# Patient Record
Sex: FEMALE | Race: OTHER | NOT HISPANIC OR LATINO
[De-identification: names, ages, dates, MRNs, and addresses within clinical notes are randomized per-mention and may not be internally consistent; named-entity substitution may affect disease eponyms.]

---

## 2017-07-28 ENCOUNTER — APPOINTMENT (OUTPATIENT)
Dept: CARDIOLOGY | Facility: CLINIC | Age: 57
End: 2017-07-28

## 2018-04-20 ENCOUNTER — RESULT REVIEW (OUTPATIENT)
Age: 58
End: 2018-04-20

## 2019-01-28 ENCOUNTER — APPOINTMENT (OUTPATIENT)
Dept: CARDIOLOGY | Facility: CLINIC | Age: 59
End: 2019-01-28
Payer: COMMERCIAL

## 2019-01-28 ENCOUNTER — NON-APPOINTMENT (OUTPATIENT)
Age: 59
End: 2019-01-28

## 2019-01-28 VITALS
WEIGHT: 135 LBS | OXYGEN SATURATION: 100 % | HEART RATE: 65 BPM | SYSTOLIC BLOOD PRESSURE: 128 MMHG | BODY MASS INDEX: 25.49 KG/M2 | DIASTOLIC BLOOD PRESSURE: 84 MMHG | HEIGHT: 61 IN

## 2019-01-28 PROCEDURE — 99215 OFFICE O/P EST HI 40 MIN: CPT

## 2019-01-28 PROCEDURE — 93000 ELECTROCARDIOGRAM COMPLETE: CPT

## 2019-01-28 RX ORDER — ERYTHROMYCIN 5 MG/G
5 OINTMENT OPHTHALMIC
Qty: 4 | Refills: 0 | Status: DISCONTINUED | COMMUNITY
Start: 2018-09-11

## 2019-01-28 RX ORDER — OMEPRAZOLE 40 MG/1
40 CAPSULE, DELAYED RELEASE ORAL
Refills: 5 | Status: ACTIVE | COMMUNITY
Start: 2019-01-28

## 2019-01-28 RX ORDER — NEOMYCIN SULFATE, POLYMYXIN B SULFATE AND DEXAMETHASONE 3.5; 10000; 1 MG/ML; [USP'U]/ML; MG/ML
3.5-10000-0.1 SUSPENSION OPHTHALMIC
Qty: 5 | Refills: 0 | Status: DISCONTINUED | COMMUNITY
Start: 2018-10-01

## 2019-01-28 RX ORDER — HYDROCORTISONE ACETATE, PRAMOXINE HCL 2.5; 1 G/100G; G/100G
2.5-1 CREAM TOPICAL
Qty: 28 | Refills: 0 | Status: DISCONTINUED | COMMUNITY
Start: 2018-12-13

## 2019-01-28 RX ORDER — LISINOPRIL AND HYDROCHLOROTHIAZIDE TABLETS 20; 12.5 MG/1; MG/1
20-12.5 TABLET ORAL
Qty: 90 | Refills: 0 | Status: DISCONTINUED | COMMUNITY
Start: 2018-09-12

## 2019-01-28 RX ORDER — AMOXICILLIN AND CLAVULANATE POTASSIUM 875; 125 MG/1; MG/1
875-125 TABLET, COATED ORAL
Qty: 20 | Refills: 0 | Status: DISCONTINUED | COMMUNITY
Start: 2018-09-14

## 2019-01-28 RX ORDER — OMEPRAZOLE 40 MG/1
40 CAPSULE, DELAYED RELEASE ORAL
Qty: 90 | Refills: 0 | Status: DISCONTINUED | COMMUNITY
Start: 2018-12-28

## 2019-01-29 ENCOUNTER — INPATIENT (INPATIENT)
Facility: HOSPITAL | Age: 59
LOS: 0 days | Discharge: HOME | DRG: 287 | End: 2019-01-29
Attending: INTERNAL MEDICINE | Admitting: INTERNAL MEDICINE
Payer: COMMERCIAL

## 2019-01-29 VITALS
WEIGHT: 134.04 LBS | SYSTOLIC BLOOD PRESSURE: 140 MMHG | HEIGHT: 61 IN | TEMPERATURE: 98 F | DIASTOLIC BLOOD PRESSURE: 81 MMHG | OXYGEN SATURATION: 98 % | HEART RATE: 65 BPM | RESPIRATION RATE: 17 BRPM

## 2019-01-29 VITALS
TEMPERATURE: 98 F | HEART RATE: 56 BPM | RESPIRATION RATE: 18 BRPM | DIASTOLIC BLOOD PRESSURE: 66 MMHG | WEIGHT: 134.04 LBS | OXYGEN SATURATION: 98 % | HEIGHT: 61 IN | SYSTOLIC BLOOD PRESSURE: 135 MMHG

## 2019-01-29 DIAGNOSIS — Z98.890 OTHER SPECIFIED POSTPROCEDURAL STATES: Chronic | ICD-10-CM

## 2019-01-29 DIAGNOSIS — I24.9 ACUTE ISCHEMIC HEART DISEASE, UNSPECIFIED: ICD-10-CM

## 2019-01-29 LAB
ALBUMIN SERPL ELPH-MCNC: 4.4 G/DL — SIGNIFICANT CHANGE UP (ref 3.3–5)
ALP SERPL-CCNC: 60 U/L — SIGNIFICANT CHANGE UP (ref 40–120)
ALT FLD-CCNC: 27 U/L — SIGNIFICANT CHANGE UP (ref 10–45)
ANION GAP SERPL CALC-SCNC: 13 MMOL/L — SIGNIFICANT CHANGE UP (ref 5–17)
APTT BLD: 29.3 SEC — SIGNIFICANT CHANGE UP (ref 27.5–36.3)
AST SERPL-CCNC: 22 U/L — SIGNIFICANT CHANGE UP (ref 10–40)
BILIRUB SERPL-MCNC: 0.5 MG/DL — SIGNIFICANT CHANGE UP (ref 0.2–1.2)
BUN SERPL-MCNC: 22 MG/DL — SIGNIFICANT CHANGE UP (ref 7–23)
CALCIUM SERPL-MCNC: 9.5 MG/DL — SIGNIFICANT CHANGE UP (ref 8.4–10.5)
CHLORIDE SERPL-SCNC: 102 MMOL/L — SIGNIFICANT CHANGE UP (ref 96–108)
CO2 SERPL-SCNC: 24 MMOL/L — SIGNIFICANT CHANGE UP (ref 22–31)
CREAT SERPL-MCNC: 0.85 MG/DL — SIGNIFICANT CHANGE UP (ref 0.5–1.3)
GLUCOSE BLDC GLUCOMTR-MCNC: 98 MG/DL — SIGNIFICANT CHANGE UP (ref 70–99)
GLUCOSE SERPL-MCNC: 95 MG/DL — SIGNIFICANT CHANGE UP (ref 70–99)
HCT VFR BLD CALC: 38.2 % — SIGNIFICANT CHANGE UP (ref 34.5–45)
HCT VFR BLD CALC: 39.9 % — SIGNIFICANT CHANGE UP (ref 34.5–45)
HGB BLD-MCNC: 13.3 G/DL — SIGNIFICANT CHANGE UP (ref 11.5–15.5)
HGB BLD-MCNC: 13.8 G/DL — SIGNIFICANT CHANGE UP (ref 11.5–15.5)
INR BLD: 1.02 RATIO — SIGNIFICANT CHANGE UP (ref 0.88–1.16)
MCHC RBC-ENTMCNC: 29.8 PG — SIGNIFICANT CHANGE UP (ref 27–34)
MCHC RBC-ENTMCNC: 30 PG — SIGNIFICANT CHANGE UP (ref 27–34)
MCHC RBC-ENTMCNC: 34.7 GM/DL — SIGNIFICANT CHANGE UP (ref 32–36)
MCHC RBC-ENTMCNC: 34.9 GM/DL — SIGNIFICANT CHANGE UP (ref 32–36)
MCV RBC AUTO: 85.8 FL — SIGNIFICANT CHANGE UP (ref 80–100)
MCV RBC AUTO: 86.1 FL — SIGNIFICANT CHANGE UP (ref 80–100)
PLATELET # BLD AUTO: 303 K/UL — SIGNIFICANT CHANGE UP (ref 150–400)
PLATELET # BLD AUTO: 310 K/UL — SIGNIFICANT CHANGE UP (ref 150–400)
POTASSIUM SERPL-MCNC: 4 MMOL/L — SIGNIFICANT CHANGE UP (ref 3.5–5.3)
POTASSIUM SERPL-SCNC: 4 MMOL/L — SIGNIFICANT CHANGE UP (ref 3.5–5.3)
PROT SERPL-MCNC: 7.4 G/DL — SIGNIFICANT CHANGE UP (ref 6–8.3)
PROTHROM AB SERPL-ACNC: 11.6 SEC — SIGNIFICANT CHANGE UP (ref 10–12.9)
RBC # BLD: 4.44 M/UL — SIGNIFICANT CHANGE UP (ref 3.8–5.2)
RBC # BLD: 4.65 M/UL — SIGNIFICANT CHANGE UP (ref 3.8–5.2)
RBC # FLD: 12.1 % — SIGNIFICANT CHANGE UP (ref 10.3–14.5)
RBC # FLD: 12.4 % — SIGNIFICANT CHANGE UP (ref 10.3–14.5)
SODIUM SERPL-SCNC: 139 MMOL/L — SIGNIFICANT CHANGE UP (ref 135–145)
TROPONIN T, HIGH SENSITIVITY RESULT: 7 NG/L — SIGNIFICANT CHANGE UP (ref 0–51)
WBC # BLD: 8.3 K/UL — SIGNIFICANT CHANGE UP (ref 3.8–10.5)
WBC # BLD: 8.7 K/UL — SIGNIFICANT CHANGE UP (ref 3.8–10.5)
WBC # FLD AUTO: 8.3 K/UL — SIGNIFICANT CHANGE UP (ref 3.8–10.5)
WBC # FLD AUTO: 8.7 K/UL — SIGNIFICANT CHANGE UP (ref 3.8–10.5)

## 2019-01-29 PROCEDURE — C1887: CPT

## 2019-01-29 PROCEDURE — 82962 GLUCOSE BLOOD TEST: CPT

## 2019-01-29 PROCEDURE — 93454 CORONARY ARTERY ANGIO S&I: CPT | Mod: 26,GC

## 2019-01-29 PROCEDURE — 71046 X-RAY EXAM CHEST 2 VIEWS: CPT | Mod: 26

## 2019-01-29 PROCEDURE — 80053 COMPREHEN METABOLIC PANEL: CPT

## 2019-01-29 PROCEDURE — 85610 PROTHROMBIN TIME: CPT

## 2019-01-29 PROCEDURE — 93454 CORONARY ARTERY ANGIO S&I: CPT

## 2019-01-29 PROCEDURE — 99285 EMERGENCY DEPT VISIT HI MDM: CPT | Mod: 25

## 2019-01-29 PROCEDURE — 85027 COMPLETE CBC AUTOMATED: CPT

## 2019-01-29 PROCEDURE — 84484 ASSAY OF TROPONIN QUANT: CPT

## 2019-01-29 PROCEDURE — 93010 ELECTROCARDIOGRAM REPORT: CPT

## 2019-01-29 PROCEDURE — 71046 X-RAY EXAM CHEST 2 VIEWS: CPT

## 2019-01-29 PROCEDURE — 76937 US GUIDE VASCULAR ACCESS: CPT | Mod: 26,GC

## 2019-01-29 PROCEDURE — 93005 ELECTROCARDIOGRAM TRACING: CPT

## 2019-01-29 PROCEDURE — C1760: CPT

## 2019-01-29 PROCEDURE — 76937 US GUIDE VASCULAR ACCESS: CPT

## 2019-01-29 PROCEDURE — C1769: CPT

## 2019-01-29 PROCEDURE — 85730 THROMBOPLASTIN TIME PARTIAL: CPT

## 2019-01-29 PROCEDURE — C1894: CPT

## 2019-01-29 RX ORDER — ASPIRIN/CALCIUM CARB/MAGNESIUM 324 MG
243 TABLET ORAL ONCE
Qty: 0 | Refills: 0 | Status: COMPLETED | OUTPATIENT
Start: 2019-01-29 | End: 2019-01-29

## 2019-01-29 RX ADMIN — Medication 243 MILLIGRAM(S): at 08:48

## 2019-01-29 NOTE — ED ADULT NURSE NOTE - CHPI ED NUR SYMPTOMS NEG
no dizziness/no back pain/no chills/no diaphoresis/no syncope/no vomiting/no fever/no congestion/no shortness of breath/no nausea

## 2019-01-29 NOTE — ED PROVIDER NOTE - PHYSICAL EXAMINATION
Gen: AAOx3, NAD   Head: NCAT  ENT: Airway patent, moist mucous membranes, nasal passageways clear, no pharyngeal erythema or exudates, uvula midline, no cervical lymphadenopathy  Cardiac: Normal rate, normal rhythm, no murmurs/rubs/gallops appreciated  Respiratory: Lungs CTA B/L  Gastrointestinal: +BS, Abdomen soft, nontender, nondistended, no rebound, no guarding, no organomegaly   MSK: No gross abnormalities, FROM of all four extremities, no edema  HEME: Extremities warm, pulses intact and symmetrical in all four extremities  Skin: No rashes, no lesions  Neuro: No gross neurologic deficits

## 2019-01-29 NOTE — PHYSICAL EXAM
[General Appearance - Well Developed] : well developed [Normal Appearance] : normal appearance [Well Groomed] : well groomed [General Appearance - Well Nourished] : well nourished [No Deformities] : no deformities [General Appearance - In No Acute Distress] : no acute distress [Normal Conjunctiva] : the conjunctiva exhibited no abnormalities [Eyelids - No Xanthelasma] : the eyelids demonstrated no xanthelasmas [Normal Oral Mucosa] : normal oral mucosa [No Oral Pallor] : no oral pallor [No Oral Cyanosis] : no oral cyanosis [Normal Jugular Venous A Waves Present] : normal jugular venous A waves present [Normal Jugular Venous V Waves Present] : normal jugular venous V waves present [No Jugular Venous Aguilar A Waves] : no jugular venous aguilar A waves [Respiration, Rhythm And Depth] : normal respiratory rhythm and effort [Exaggerated Use Of Accessory Muscles For Inspiration] : no accessory muscle use [Auscultation Breath Sounds / Voice Sounds] : lungs were clear to auscultation bilaterally [Heart Rate And Rhythm] : heart rate and rhythm were normal [Heart Sounds] : normal S1 and S2 [Murmurs] : no murmurs present [Abdomen Soft] : soft [Abdomen Tenderness] : non-tender [Abdomen Mass (___ Cm)] : no abdominal mass palpated [Abnormal Walk] : normal gait [Gait - Sufficient For Exercise Testing] : the gait was sufficient for exercise testing [Nail Clubbing] : no clubbing of the fingernails [Cyanosis, Localized] : no localized cyanosis [Petechial Hemorrhages (___cm)] : no petechial hemorrhages [Skin Color & Pigmentation] : normal skin color and pigmentation [] : no rash [No Venous Stasis] : no venous stasis [Skin Lesions] : no skin lesions [No Skin Ulcers] : no skin ulcer [No Xanthoma] : no  xanthoma was observed [Oriented To Time, Place, And Person] : oriented to person, place, and time [Affect] : the affect was normal [Mood] : the mood was normal [No Anxiety] : not feeling anxious

## 2019-01-29 NOTE — ED ADULT NURSE NOTE - NSIMPLEMENTINTERV_GEN_ALL_ED
Implemented All Universal Safety Interventions:  [place] to call system. Call bell, personal items and telephone within reach. Instruct patient to call for assistance. Room bathroom lighting operational. Non-slip footwear when patient is off stretcher. Physically safe environment: no spills, clutter or unnecessary equipment. Stretcher in lowest position, wheels locked, appropriate side rails in place.

## 2019-01-29 NOTE — ED PROVIDER NOTE - NS ED ROS FT
Gen: No fever, normal appetite  Eyes: No eye irritation or discharge  ENT: No ear pain, congestion, sore throat  Resp: No cough or trouble breathing  Cardiovascular: +chest pain, no palpitation  Gastroenteric: No nausea/vomiting, diarrhea, constipation  :  No change in urine output; no dysuria  MS: No joint or muscle pain  Skin: No rashes  Neuro: No headache; no abnormal movements  Remainder negative, except as per the HPI

## 2019-01-29 NOTE — H&P CARDIOLOGY - PMH
Coronary stent    Pure hypercholesterolemia    Coronary stent  to RCA 6/19/09  Hypertension    Anxiety disorder    CAD (coronary artery disease)    GERD (gastroesophageal reflux disease)

## 2019-01-29 NOTE — ED ADULT TRIAGE NOTE - CHIEF COMPLAINT QUOTE
midsternal chest pain radiating to the right jaw with SOB starting last night while laying down, intermittent. denies N/V, numbness or tingling. PMH 1 stent placement

## 2019-01-29 NOTE — ED ADULT NURSE NOTE - OBJECTIVE STATEMENT
58F comes to ER c/o chest pressure. She is a&ox3 in no acute distress. States she developed chest pressure and jaw pain this AM. Also notices more fatigue on exertion. States her pressure is similar to the pain she had prior to her cardiac stent in 2009. She denies dizziness/N/V/D/abdominal pain/back pain/fever/chills/cough. States pain does not radiate. Pain not made worse by exertion. On exam, clear lungs, no edema, soft abdomen, NSR on cardiac monitor. Family is at bedside. patient is on cardiac monitor. EKG performed. Will continue to monitor.

## 2019-01-29 NOTE — ED PROVIDER NOTE - ATTENDING CONTRIBUTION TO CARE
57 y/o female with the above documented history and HPI who on exam appears well and comfortable. VSs noted, neck supple, lungs CTA, cardiac sounds s/ audible m/r/g, abdomen soft, NT/ND, extremities s/ asymmetry, calf ttp or palpable cord, skin s/ rash or edema and neurologically intact. Full investigation initiated. EKG unremarkable. Labs and CXR pending. Having experienced the very same prior to her PCI c/ stent, I anticipate admission with nothing clinically evident to suggest any alternative or additional acute or emergent process. Will follow her remaining studies, d/w her Cardiologist and treat/dispo accordingly.

## 2019-01-29 NOTE — ED PROVIDER NOTE - PMH
Anxiety Disorder    CAD (Coronary Artery Disease)    Coronary Stent    Coronary Stent  to RCA 6/19/09  GERD (Gastroesophageal Reflux    Hypercholesterolemia    Hypertension

## 2019-01-29 NOTE — ED PROVIDER NOTE - OBJECTIVE STATEMENT
58F hx CAD s/p stent x 1 2009, presenting w/ pressure like 58F hx CAD s/p stent x 1 2009, presenting w/ pressure like chest pain with associated sob x few minutes 58F hx CAD s/p stent x 1 2009, presenting w/ pressure like chest pain with associated sob x few minutes, occurring at 7pm yesterday evening, at rest. She went to sleep but today awoke and has persistent sensation of chest heaviness, 58F hx CAD s/p stent x 1 2009, presenting w/ pressure like chest pain with associated sob x few minutes, occurring at 7pm yesterday evening, at rest. She went to sleep but today awoke and has persistent sensation of chest heaviness, worse on ambulating up stairs. Last echo/stress was normal last year. No recent fevers/chills/ sob at rest/ or LE edema. Did go to [facility] a week ago but states flight was less than 6 hours . No dizziness or palpitations     Cardioloigst Dr Lucero 58F hx CAD s/p stent x 1 2009, presenting w/ pressure like chest pain with associated sob x few minutes, occurring at 7pm yesterday evening, at rest. She went to sleep but today awoke and has persistent sensation of chest heaviness, worse on ambulating up stairs. Last echo/stress was normal 2016. No recent fevers/chills/ sob at rest/ or LE edema. Did go to [facility] a week ago but states flight was less than 6 hours . No dizziness or palpitations     Cardioloigst Dr Lucero

## 2019-01-29 NOTE — DISCUSSION/SUMMARY
[FreeTextEntry1] : HTN-better controlled\par \par CAD- new sx after lost to followup.  Unstable angina  plan cath\par \par Lipids- stable with LDL goal < 70\par \par Followup in 2 weeks\par \par Cont MEDS

## 2019-02-11 ENCOUNTER — APPOINTMENT (OUTPATIENT)
Dept: CARDIOLOGY | Facility: CLINIC | Age: 59
End: 2019-02-11
Payer: COMMERCIAL

## 2019-02-11 VITALS
DIASTOLIC BLOOD PRESSURE: 89 MMHG | OXYGEN SATURATION: 99 % | BODY MASS INDEX: 25.49 KG/M2 | HEART RATE: 75 BPM | SYSTOLIC BLOOD PRESSURE: 138 MMHG | HEIGHT: 61 IN | WEIGHT: 135 LBS

## 2019-02-11 DIAGNOSIS — E78.00 PURE HYPERCHOLESTEROLEMIA, UNSPECIFIED: ICD-10-CM

## 2019-02-11 PROCEDURE — 93000 ELECTROCARDIOGRAM COMPLETE: CPT

## 2019-02-11 PROCEDURE — 99214 OFFICE O/P EST MOD 30 MIN: CPT

## 2019-04-29 NOTE — DISCUSSION/SUMMARY
Pt is overdue for appt   [FreeTextEntry1] : HTN-better controlled\par \par CAD- cath with patent vessles.  Sx improved\par \par Lipids- stable with LDL goal < 70\par \par Followup in 6 mths\par \par Cont MEDS

## 2019-08-12 ENCOUNTER — APPOINTMENT (OUTPATIENT)
Dept: CARDIOLOGY | Facility: CLINIC | Age: 59
End: 2019-08-12

## 2019-09-25 ENCOUNTER — APPOINTMENT (OUTPATIENT)
Dept: OTOLARYNGOLOGY | Facility: CLINIC | Age: 59
End: 2019-09-25
Payer: COMMERCIAL

## 2019-09-25 VITALS
HEART RATE: 75 BPM | WEIGHT: 135 LBS | DIASTOLIC BLOOD PRESSURE: 88 MMHG | HEIGHT: 61 IN | SYSTOLIC BLOOD PRESSURE: 150 MMHG | BODY MASS INDEX: 25.49 KG/M2

## 2019-09-25 DIAGNOSIS — H04.209 UNSPECIFIED EPIPHORA, UNSPECIFIED SIDE: ICD-10-CM

## 2019-09-25 DIAGNOSIS — R06.83 SNORING: ICD-10-CM

## 2019-09-25 PROCEDURE — 99204 OFFICE O/P NEW MOD 45 MIN: CPT | Mod: 25

## 2019-09-25 PROCEDURE — 31575 DIAGNOSTIC LARYNGOSCOPY: CPT

## 2019-09-25 NOTE — REVIEW OF SYSTEMS
[Sneezing] : sneezing [Seasonal Allergies] : seasonal allergies [Post Nasal Drip] : post nasal drip [Ear Itch] : ear itch [Problem Snoring] : problem snoring [Sinus Pressure] : sinus pressure [Sinus Pain] : sinus pain [Snoring With Pauses] : snoring with pauses [Throat Clearing] : throat clearing [Throat Itching] : throat itching [Throat Dryness] : throat dryness [Eyes Itch] : itching of the eyes [Joint Pain] : joint pain [Heartburn] : heartburn [Anxiety] : anxiety [Negative] : Heme/Lymph [FreeTextEntry8] : muscle aches

## 2019-09-25 NOTE — PHYSICAL EXAM
[] : septum deviated to the left [Laryngoscopy Performed] : laryngoscopy was performed, see procedure section for findings [Midline] : trachea located in midline position [Normal] : no rashes [de-identified] : small area in right cheek [de-identified] : enlarged

## 2019-09-25 NOTE — PROCEDURE
[Flexible Endoscope] : examined with the flexible endoscope [Serial Number: ___] : Serial Number: [unfilled] [Image(s) Captured] : image(s) captured and filed [Obstruction] : acute airway obstruction evaluation [Complicated Symptoms] : complicated symptoms requiring more thorough examination than provided by mirror [Topical Lidocaine] : topical lidocaine [Oxymetazoline HCl] : oxymetazoline HCl [Normal] : normal vallecula [Lateral Wall ___ %] : the lateral wall was [unfilled]U% collapsed [Velopharynx ___ %] : the velopharynx was [unfilled]U% collapsed [de-identified] : Patient was placed in the examination chair in a sitting position. The nose was decongested with oxymetazoline nasal solution. The airway was anesthetized with 4% Xylocaine.  The back of the throat was anesthetized with Cetacaine. Direct flexible/rigid video endoscopy was performed. Findings revealed:\par Patient is severely deviated septum to the right and left nasal fossa completely obstructed fracture line on the right nasopharynx collapses with a positive neuro maneuver larynx and epiglottis subglottic space open and clear [de-identified] : BOLA

## 2019-09-25 NOTE — HISTORY OF PRESENT ILLNESS
[de-identified] : 58 year old female here for nasal and sinus congestion, post nasal drip, snoring and allergies.  States has had symptoms for years.  States 1-2 sinus infections in the past year, treated with antibiotics.  Denies recent cat scan of sinuses.  States using Flonase most days, with minimal relief.  States snoring at night, unable to tolerate CPAP.  Patient requires an up to date sleep study.  Patient has a BMI of 25.5.  Patient has tried a CPAP machine without relief, feels claustrophobic, states has a panic attack with it on because difficulty breathing.  Patient has tried using a dental appliance without relief. \par Patient comorbidities include hypertension.  \par \par

## 2019-09-25 NOTE — CONSULT LETTER
[Dear  ___] : Dear  [unfilled], [Consult Letter:] : I had the pleasure of evaluating your patient, [unfilled]. [Sincerely,] : Sincerely, [Consult Closing:] : Thank you very much for allowing me to participate in the care of this patient.  If you have any questions, please do not hesitate to contact me. [Please see my note below.] : Please see my note below. [FreeTextEntry3] : Connor Izaguirre MD, KOTA, FACS\par  Department Otolaryngology\par Director of [facility]\par Professor of Otolaryngology, \par [employer]\par

## 2019-09-25 NOTE — REASON FOR VISIT
[Initial Evaluation] : an initial evaluation for [Other: _____] : [unfilled] [FreeTextEntry2] : here for sinus congestion, post nasal drip, snoring and allergies

## 2019-10-11 ENCOUNTER — OUTPATIENT (OUTPATIENT)
Dept: OUTPATIENT SERVICES | Facility: HOSPITAL | Age: 59
LOS: 1 days | End: 2019-10-11
Payer: COMMERCIAL

## 2019-10-11 VITALS
OXYGEN SATURATION: 98 % | WEIGHT: 136.91 LBS | TEMPERATURE: 98 F | HEIGHT: 61 IN | RESPIRATION RATE: 14 BRPM | HEART RATE: 61 BPM | SYSTOLIC BLOOD PRESSURE: 110 MMHG | DIASTOLIC BLOOD PRESSURE: 70 MMHG

## 2019-10-11 DIAGNOSIS — Z98.890 OTHER SPECIFIED POSTPROCEDURAL STATES: Chronic | ICD-10-CM

## 2019-10-11 DIAGNOSIS — G47.33 OBSTRUCTIVE SLEEP APNEA (ADULT) (PEDIATRIC): ICD-10-CM

## 2019-10-11 DIAGNOSIS — Z95.5 PRESENCE OF CORONARY ANGIOPLASTY IMPLANT AND GRAFT: Chronic | ICD-10-CM

## 2019-10-11 LAB
ANION GAP SERPL CALC-SCNC: 12 MMO/L — SIGNIFICANT CHANGE UP (ref 7–14)
BUN SERPL-MCNC: 21 MG/DL — SIGNIFICANT CHANGE UP (ref 7–23)
CALCIUM SERPL-MCNC: 9.7 MG/DL — SIGNIFICANT CHANGE UP (ref 8.4–10.5)
CHLORIDE SERPL-SCNC: 101 MMOL/L — SIGNIFICANT CHANGE UP (ref 98–107)
CO2 SERPL-SCNC: 29 MMOL/L — SIGNIFICANT CHANGE UP (ref 22–31)
CREAT SERPL-MCNC: 0.78 MG/DL — SIGNIFICANT CHANGE UP (ref 0.5–1.3)
GLUCOSE SERPL-MCNC: 91 MG/DL — SIGNIFICANT CHANGE UP (ref 70–99)
HCT VFR BLD CALC: 44.4 % — SIGNIFICANT CHANGE UP (ref 34.5–45)
HGB BLD-MCNC: 14.1 G/DL — SIGNIFICANT CHANGE UP (ref 11.5–15.5)
MCHC RBC-ENTMCNC: 28.3 PG — SIGNIFICANT CHANGE UP (ref 27–34)
MCHC RBC-ENTMCNC: 31.8 % — LOW (ref 32–36)
MCV RBC AUTO: 89 FL — SIGNIFICANT CHANGE UP (ref 80–100)
NRBC # FLD: 0 K/UL — SIGNIFICANT CHANGE UP (ref 0–0)
PLATELET # BLD AUTO: 323 K/UL — SIGNIFICANT CHANGE UP (ref 150–400)
PMV BLD: 10 FL — SIGNIFICANT CHANGE UP (ref 7–13)
POTASSIUM SERPL-MCNC: 3.9 MMOL/L — SIGNIFICANT CHANGE UP (ref 3.5–5.3)
POTASSIUM SERPL-SCNC: 3.9 MMOL/L — SIGNIFICANT CHANGE UP (ref 3.5–5.3)
RBC # BLD: 4.99 M/UL — SIGNIFICANT CHANGE UP (ref 3.8–5.2)
RBC # FLD: 13 % — SIGNIFICANT CHANGE UP (ref 10.3–14.5)
SODIUM SERPL-SCNC: 142 MMOL/L — SIGNIFICANT CHANGE UP (ref 135–145)
WBC # BLD: 8.18 K/UL — SIGNIFICANT CHANGE UP (ref 3.8–10.5)
WBC # FLD AUTO: 8.18 K/UL — SIGNIFICANT CHANGE UP (ref 3.8–10.5)

## 2019-10-11 PROCEDURE — 93010 ELECTROCARDIOGRAM REPORT: CPT

## 2019-10-11 RX ORDER — CETIRIZINE HYDROCHLORIDE 10 MG/1
1 TABLET ORAL
Qty: 0 | Refills: 0 | DISCHARGE

## 2019-10-11 NOTE — H&P PST ADULT - NEGATIVE BREAST SYMPTOMS
no breast tenderness L/no nipple discharge R/no nipple discharge L/no breast tenderness R/no breast lump L/no breast lump R

## 2019-10-11 NOTE — H&P PST ADULT - NEGATIVE CARDIOVASCULAR SYMPTOMS
no dyspnea on exertion/no palpitations/no claudication/no chest pain/no orthopnea/no paroxysmal nocturnal dyspnea

## 2019-10-11 NOTE — H&P PST ADULT - HISTORY OF PRESENT ILLNESS
58 year old female presents with preop diagnosis of BOLA, deviated nasal septum, and hypertrophy of nasal turbinates scheduled for septoplasty, bilateral turbinectomy, drug induced sleep endoscopy on 10/29/19. Patient has past medical history of Bola, HTN, HLD, CAD s/p 1 stent in RCA 2009, recent angiogram 1/2019 did not indicate restenosis of stent. Patient states she was diagnosed with BOLA in 2018 but does not feel comfortable using CPAP, reports occasional daytime sleepiness.

## 2019-10-11 NOTE — H&P PST ADULT - NEGATIVE ENMT SYMPTOMS
no ear pain/no vertigo/no tinnitus/no nasal discharge/no sinus symptoms/no hearing difficulty/no gum bleeding/no dry mouth/no throat pain/no dysphagia

## 2019-10-11 NOTE — H&P PST ADULT - NSICDXFAMILYHX_GEN_ALL_CORE_FT
FAMILY HISTORY:  Mother  Still living? No  Family history of early CAD, Age at diagnosis: Age Unknown

## 2019-10-11 NOTE — H&P PST ADULT - RS GEN PE MLT RESP DETAILS PC
breath sounds equal/no rales/no rhonchi/airway patent/clear to auscultation bilaterally/respirations non-labored/good air movement

## 2019-10-11 NOTE — H&P PST ADULT - NSICDXPASTMEDICALHX_GEN_ALL_CORE_FT
PAST MEDICAL HISTORY:  Anxiety Disorder     CAD (Coronary Artery Disease)     Coronary Stent to RCA 6/19/09    Deviated septum     GERD (Gastroesophageal Reflux     Hypercholesterolemia     Hypertension     BOLA on CPAP refuses to use bipap

## 2019-10-11 NOTE — H&P PST ADULT - NSICDXPROBLEM_GEN_ALL_CORE_FT
Problem: BOLA, deviated nasal septum, and hypertrophy of nasal turbinates    Assessment and Plan: Patient scheduled for septoplasty, bilateral turbinectomy, drug induced sleep endoscopy on 10/29/19.    Patient provided with verbal and written presurgical instructions; verbalized understanding  with teach back.    Patient instructed to take her own omeprazole for GI prophylaxis; verbalized understanding.      OR booking notified of BOLA  OR booking notified of coronary stent     Echo, angiogram and Stress test in chart     Cardiac evaluation requested by surgeon and PST for aspirin plan, patient verbalized understanding, will make appointment    Problem: Hypertension  Assessment and Plan: Patient instructed to take atenolol on day of procedure, verbalized understanding.  Patient instructed to hold lisinopril- HCTZ on DOS, verbalized understanding

## 2019-10-11 NOTE — H&P PST ADULT - NSICDXPASTSURGICALHX_GEN_ALL_CORE_FT
PAST SURGICAL HISTORY:  S/P colonoscopy     S/P Lymph Node Biopsy R axilla x2 1980's    S/P primary angioplasty with coronary stent 2009    Tubal Ligation 1993

## 2019-10-21 ENCOUNTER — APPOINTMENT (OUTPATIENT)
Dept: CARDIOLOGY | Facility: CLINIC | Age: 59
End: 2019-10-21

## 2019-10-29 ENCOUNTER — RESULT REVIEW (OUTPATIENT)
Age: 59
End: 2019-10-29

## 2019-10-29 ENCOUNTER — APPOINTMENT (OUTPATIENT)
Dept: OTOLARYNGOLOGY | Facility: HOSPITAL | Age: 59
End: 2019-10-29

## 2019-10-29 ENCOUNTER — OUTPATIENT (OUTPATIENT)
Dept: OUTPATIENT SERVICES | Facility: HOSPITAL | Age: 59
LOS: 1 days | Discharge: HOME | End: 2019-10-29
Payer: COMMERCIAL

## 2019-10-29 VITALS
TEMPERATURE: 98 F | DIASTOLIC BLOOD PRESSURE: 74 MMHG | RESPIRATION RATE: 16 BRPM | HEIGHT: 61 IN | SYSTOLIC BLOOD PRESSURE: 152 MMHG | OXYGEN SATURATION: 100 % | WEIGHT: 134.92 LBS | HEART RATE: 56 BPM

## 2019-10-29 DIAGNOSIS — Z98.890 OTHER SPECIFIED POSTPROCEDURAL STATES: Chronic | ICD-10-CM

## 2019-10-29 DIAGNOSIS — Z95.5 PRESENCE OF CORONARY ANGIOPLASTY IMPLANT AND GRAFT: Chronic | ICD-10-CM

## 2019-10-29 DIAGNOSIS — G47.33 OBSTRUCTIVE SLEEP APNEA (ADULT) (PEDIATRIC): ICD-10-CM

## 2019-10-29 PROBLEM — J34.2 DEVIATED NASAL SEPTUM: Chronic | Status: ACTIVE | Noted: 2019-10-11

## 2019-10-29 PROCEDURE — 88304 TISSUE EXAM BY PATHOLOGIST: CPT | Mod: 26

## 2019-10-29 PROCEDURE — 31575 DIAGNOSTIC LARYNGOSCOPY: CPT | Mod: GC

## 2019-10-29 PROCEDURE — 30520 REPAIR OF NASAL SEPTUM: CPT | Mod: GC

## 2019-10-29 PROCEDURE — 30130 EXCISE INFERIOR TURBINATE: CPT | Mod: 50,GC

## 2019-10-29 PROCEDURE — 99213 OFFICE O/P EST LOW 20 MIN: CPT

## 2019-10-29 PROCEDURE — 88311 DECALCIFY TISSUE: CPT | Mod: 26

## 2019-10-29 RX ORDER — ONDANSETRON 8 MG/1
4 TABLET, FILM COATED ORAL ONCE
Refills: 0 | Status: DISCONTINUED | OUTPATIENT
Start: 2019-10-29 | End: 2019-11-16

## 2019-10-29 RX ORDER — OXYCODONE AND ACETAMINOPHEN 5; 325 MG/1; MG/1
2 TABLET ORAL EVERY 6 HOURS
Refills: 0 | Status: DISCONTINUED | OUTPATIENT
Start: 2019-10-29 | End: 2019-10-29

## 2019-10-29 RX ORDER — OXYCODONE HYDROCHLORIDE 5 MG/1
5 TABLET ORAL ONCE
Refills: 0 | Status: DISCONTINUED | OUTPATIENT
Start: 2019-10-29 | End: 2019-10-29

## 2019-10-29 RX ORDER — LISINOPRIL 2.5 MG/1
20 TABLET ORAL DAILY
Refills: 0 | Status: DISCONTINUED | OUTPATIENT
Start: 2019-10-29 | End: 2019-11-16

## 2019-10-29 RX ORDER — ATENOLOL 25 MG/1
50 TABLET ORAL DAILY
Refills: 0 | Status: DISCONTINUED | OUTPATIENT
Start: 2019-10-29 | End: 2019-11-16

## 2019-10-29 RX ORDER — HYDROCHLOROTHIAZIDE 25 MG
12.5 TABLET ORAL DAILY
Refills: 0 | Status: DISCONTINUED | OUTPATIENT
Start: 2019-10-29 | End: 2019-11-16

## 2019-10-29 RX ORDER — SODIUM CHLORIDE 9 MG/ML
1000 INJECTION, SOLUTION INTRAVENOUS
Refills: 0 | Status: DISCONTINUED | OUTPATIENT
Start: 2019-10-29 | End: 2019-11-16

## 2019-10-29 RX ORDER — HYDROMORPHONE HYDROCHLORIDE 2 MG/ML
0.3 INJECTION INTRAMUSCULAR; INTRAVENOUS; SUBCUTANEOUS
Refills: 0 | Status: DISCONTINUED | OUTPATIENT
Start: 2019-10-29 | End: 2019-10-29

## 2019-10-29 RX ORDER — PANTOPRAZOLE SODIUM 20 MG/1
40 TABLET, DELAYED RELEASE ORAL
Refills: 0 | Status: DISCONTINUED | OUTPATIENT
Start: 2019-10-29 | End: 2019-11-16

## 2019-10-29 RX ORDER — OXYCODONE AND ACETAMINOPHEN 5; 325 MG/1; MG/1
1 TABLET ORAL EVERY 4 HOURS
Refills: 0 | Status: DISCONTINUED | OUTPATIENT
Start: 2019-10-29 | End: 2019-10-30

## 2019-10-29 RX ADMIN — OXYCODONE AND ACETAMINOPHEN 1 TABLET(S): 5; 325 TABLET ORAL at 20:22

## 2019-10-29 RX ADMIN — HYDROMORPHONE HYDROCHLORIDE 0.3 MILLIGRAM(S): 2 INJECTION INTRAMUSCULAR; INTRAVENOUS; SUBCUTANEOUS at 16:32

## 2019-10-29 RX ADMIN — SODIUM CHLORIDE 30 MILLILITER(S): 9 INJECTION, SOLUTION INTRAVENOUS at 13:00

## 2019-10-29 RX ADMIN — HYDROMORPHONE HYDROCHLORIDE 0.3 MILLIGRAM(S): 2 INJECTION INTRAMUSCULAR; INTRAVENOUS; SUBCUTANEOUS at 16:18

## 2019-10-29 NOTE — ASU PATIENT PROFILE, ADULT - PMH
Anxiety Disorder    CAD (Coronary Artery Disease)    Coronary Stent  to RCA 6/19/09  Deviated septum    GERD (Gastroesophageal Reflux    Hypercholesterolemia    Hypertension    BOLA on CPAP  refuses to use bipap

## 2019-10-29 NOTE — ASU PATIENT PROFILE, ADULT - PSH
S/P colonoscopy    S/P Lymph Node Biopsy  R axilla x2 1980's  S/P primary angioplasty with coronary stent  2009  Tubal Ligation  1993

## 2019-10-29 NOTE — BRIEF OPERATIVE NOTE - OPERATION/FINDINGS
Drug Induced Sleep Endoscopy  Septoplasty  Turbinate reduction  Right middle nadege bullosa reduction

## 2019-10-30 ENCOUNTER — APPOINTMENT (OUTPATIENT)
Dept: OTOLARYNGOLOGY | Facility: CLINIC | Age: 59
End: 2019-10-30

## 2019-10-30 VITALS
DIASTOLIC BLOOD PRESSURE: 56 MMHG | RESPIRATION RATE: 18 BRPM | TEMPERATURE: 99 F | HEART RATE: 62 BPM | SYSTOLIC BLOOD PRESSURE: 102 MMHG | OXYGEN SATURATION: 97 %

## 2019-10-30 RX ORDER — CEPHALEXIN 500 MG
1 CAPSULE ORAL
Qty: 14 | Refills: 0
Start: 2019-10-30 | End: 2019-11-05

## 2019-10-30 RX ADMIN — PANTOPRAZOLE SODIUM 40 MILLIGRAM(S): 20 TABLET, DELAYED RELEASE ORAL at 06:43

## 2019-10-30 RX ADMIN — OXYCODONE AND ACETAMINOPHEN 1 TABLET(S): 5; 325 TABLET ORAL at 04:02

## 2019-10-30 RX ADMIN — ATENOLOL 50 MILLIGRAM(S): 25 TABLET ORAL at 06:43

## 2019-10-30 RX ADMIN — LISINOPRIL 20 MILLIGRAM(S): 2.5 TABLET ORAL at 06:43

## 2019-10-30 RX ADMIN — OXYCODONE AND ACETAMINOPHEN 1 TABLET(S): 5; 325 TABLET ORAL at 04:31

## 2019-10-30 RX ADMIN — Medication 12.5 MILLIGRAM(S): at 06:43

## 2019-10-30 RX ADMIN — SODIUM CHLORIDE 30 MILLILITER(S): 9 INJECTION, SOLUTION INTRAVENOUS at 00:01

## 2019-10-30 NOTE — ASU DISCHARGE PLAN (ADULT/PEDIATRIC) - CARE PROVIDER_API CALL
Connor Izaguirre; KOTA)  Otolaryngology  [address] [place] [address] [phone]  Phone: [phone]  Fax: [phone]  Follow Up Time:

## 2019-10-30 NOTE — PROGRESS NOTE ADULT - SUBJECTIVE AND OBJECTIVE BOX
Patient seen and examined. No acute events overnight. nasal packing removed this AM.     Vital Signs Last 24 Hrs  T(C): 36.9 (30 Oct 2019 04:00), Max: 36.9 (29 Oct 2019 12:45)  T(F): 98.5 (30 Oct 2019 04:00), Max: 98.5 (30 Oct 2019 00:00)  HR: 68 (30 Oct 2019 07:00) (56 - 81)  BP: 118/69 (30 Oct 2019 06:00) (100/51 - 152/74)  BP(mean): 84 (30 Oct 2019 02:00) (63 - 103)  RR: 16 (30 Oct 2019 07:00) (11 - 24)  SpO2: 97% (30 Oct 2019 07:00) (94% - 100%)  NAD, awake  Breathing comfortably on room air, no stridor, no stertor  Nose: bilateral merocels in place s/p removal, bilateral nares with blood staining and minimal sanginous secretions  OC/OP: tongue wnl, FOM soft/flat, OP clear  Neck soft, flat    A/P 59F s/p DISE, septo, turbs 10/29. Recovering well  - antibiotics  - pain control PRN  - regular diet  - home meds  - dispo: DC home today

## 2019-11-06 ENCOUNTER — APPOINTMENT (OUTPATIENT)
Dept: OTOLARYNGOLOGY | Facility: CLINIC | Age: 59
End: 2019-11-06
Payer: COMMERCIAL

## 2019-11-06 PROCEDURE — 99024 POSTOP FOLLOW-UP VISIT: CPT

## 2019-11-06 NOTE — REVIEW OF SYSTEMS
[As Noted in HPI] : as noted in HPI [Nasal Congestion] : no nasal congestion [Nose Bleeds] : no nose bleeds [Negative] : Heme/Lymph

## 2019-11-06 NOTE — HISTORY OF PRESENT ILLNESS
[de-identified] : Ms. Mendez presents one week post septoplasty and turbinectomy for splint removal.  She is doing well but reports tenderness on the tip of her nose.  Denies any bleeding, pain, or discomfort and reports that her breating is slightly improved.

## 2019-11-22 ENCOUNTER — APPOINTMENT (OUTPATIENT)
Dept: OTOLARYNGOLOGY | Facility: CLINIC | Age: 59
End: 2019-11-22
Payer: COMMERCIAL

## 2019-11-22 PROCEDURE — 99024 POSTOP FOLLOW-UP VISIT: CPT

## 2019-11-22 NOTE — PROCEDURE
[FreeTextEntry3] : Nose sprayed with Lidocaine and Afrin.  Cotton soaked with afrin and lidocaine inserted into nose and left in place for 5 mins. Nose suctioned and extensive crusts debrided from  bilateral turbinates. Pt can breathe well . Pt tolerated procedure\par

## 2019-11-22 NOTE — HISTORY OF PRESENT ILLNESS
[de-identified] : 59 year female with progressive nasal congestion and  pain.  Pt is healing well. Pt admits to using saline  spray and gel diligently throughout the weeks. Pt has known anxiety disorder and wakes up consistently several times through the night feeling like she is suffocating.\par

## 2019-11-22 NOTE — REASON FOR VISIT
[Post-Operative Visit] : a post-operative visit [FreeTextEntry2] : nasal congestion [FreeTextEntry1] : s/p septoplasty and turbinectomy

## 2019-11-22 NOTE — PHYSICAL EXAM
[Normal] : no abnormal secretions [de-identified] : Septum is midline [de-identified] :  diffuse opacified crusting bilaterally lining the nasal cavity

## 2019-12-27 ENCOUNTER — RX RENEWAL (OUTPATIENT)
Age: 59
End: 2019-12-27

## 2020-07-01 ENCOUNTER — APPOINTMENT (OUTPATIENT)
Dept: OTOLARYNGOLOGY | Facility: CLINIC | Age: 60
End: 2020-07-01
Payer: COMMERCIAL

## 2020-07-01 VITALS
HEART RATE: 60 BPM | SYSTOLIC BLOOD PRESSURE: 126 MMHG | HEIGHT: 61 IN | WEIGHT: 135 LBS | BODY MASS INDEX: 25.49 KG/M2 | DIASTOLIC BLOOD PRESSURE: 80 MMHG

## 2020-07-01 DIAGNOSIS — R53.83 OTHER FATIGUE: ICD-10-CM

## 2020-07-01 DIAGNOSIS — L29.9 PRURITUS, UNSPECIFIED: ICD-10-CM

## 2020-07-01 DIAGNOSIS — G47.33 OBSTRUCTIVE SLEEP APNEA (ADULT) (PEDIATRIC): ICD-10-CM

## 2020-07-01 PROCEDURE — 99214 OFFICE O/P EST MOD 30 MIN: CPT | Mod: 25

## 2020-07-01 PROCEDURE — 31231 NASAL ENDOSCOPY DX: CPT

## 2020-07-01 RX ORDER — LEVOCETIRIZINE DIHYDROCHLORIDE 5 MG/1
5 TABLET ORAL
Refills: 0 | Status: DISCONTINUED | COMMUNITY
End: 2020-07-01

## 2020-07-01 RX ORDER — FLUTICASONE PROPIONATE 50 MCG
SPRAY, SUSPENSION NASAL
Refills: 0 | Status: DISCONTINUED | COMMUNITY
End: 2020-07-01

## 2020-07-01 RX ORDER — FLUOCINOLONE ACETONIDE 0.11 MG/ML
0.01 OIL AURICULAR (OTIC) DAILY
Qty: 1 | Refills: 4 | Status: ACTIVE | COMMUNITY
Start: 2020-07-01 | End: 1900-01-01

## 2020-07-01 RX ORDER — MOMETASONE 50 UG/1
50 SPRAY, METERED NASAL DAILY
Qty: 1 | Refills: 5 | Status: ACTIVE | COMMUNITY
Start: 2020-07-01 | End: 1900-01-01

## 2020-07-01 NOTE — HISTORY OF PRESENT ILLNESS
[de-identified] : 59 year old female follow up s/p Drug-induced sleep endoscopy, septoplasty, turbinectomy 10/29/2019.  States as of last week has started feeling a nasal obstruction, left side is worse than the right.  States having a little post nasal drip, sometimes has a little runny nose.  States ears are itchy.  Denies otalgia, otorrhea, ear infections.

## 2020-07-01 NOTE — PHYSICAL EXAM
[Nasal Endoscopy Performed] : nasal endoscopy was performed, see procedure section for findings [] : septum deviated to the left [Midline] : trachea located in midline position [Laryngoscopy Performed] : laryngoscopy was performed, see procedure section for findings [Normal] : no rashes [de-identified] : small area in right cheek [de-identified] : dry [de-identified] : allergic

## 2020-07-01 NOTE — REASON FOR VISIT
[Subsequent Evaluation] : a subsequent evaluation for [Nasal Septum (Deviated)] : deviated nasal septum [FreeTextEntry2] : follow up s/p Drug-induced sleep endoscopy, septoplasty, turbinectomy 10/29/2019

## 2020-07-01 NOTE — PROCEDURE
[Image(s) Captured] : image(s) captured and filed [Video Captured] : video captured and filed [Topical Lidocaine] : topical lidocaine [Oxymetazoline HCl] : oxymetazoline HCl [Flexible Endoscope] : examined with the flexible endoscope [Serial Number: ___] : Serial Number: [unfilled] [FreeTextEntry6] : Pre-op indication(s): nasal congestion left greater than right\par Post-op indication(s): slight septal swelling on left. allergic turbinates\par Verbal consent obtained from patient.\par “Anterior rhinoscopy insufficient to account for symptoms” \par Details for procedure: \par Scope #: [id number]\par Type of scope:    flexible fiber optic telescope   X  Rigid glass telescope \par Anesthesia and/or vasoconstriction was achieved topically by using: \par 4% Lidocaine spray   0.05% Oxymetazoline     Other ______ \par The following anatomic sites were directly examined in a sequential fashion: \par The scope was introduced in the nasal passage between the middle and inferior turbinates to exam the inferior portion of the middle meatus and the fontanelle, as well as the maxillary ostia. Next, the scope was passed medially and posteriorly to the middle turbinates to examine the sphenoethmoid recess and the superior turbinate region. \par Upon visualization the finders are as follows: \par Nasal Septum:     Deviated to slight  left   but still wide open\par Bleeding site cauterized:    Anterior   left   right   Posterior   left   right \par Method:   Silver Nitrate   YAG Laser    Electrocautery ______ \par Right Side: \par * Mucosa: allergic\par * Mucous: Normal\par * Polyp: Normal\par * Inferior Turbinate: Normal\par * Middle Turbinate: Normal\par * Superior Turbinate: Normal\par * Inferior Meatus: Normal\par * Middle Meatus: Normal\par * Super Meatus: Normal\par * Sphenoethmoidal Recess: Normal\par Left Side: \par * Mucosa: allergic\par * Mucous: Normal\par * Polyp: Normal\par * Inferior Turbinate: Normal\par * Middle Turbinate: Normal\par * Superior Turbinate: Normal\par * Inferior Meatus: Normal\par * Middle Meatus: Normal\par * Super Meatus: Normal\par * Sphenoethmoidal Recess: Normal\par The patient tolerated the procedure well without any complications.\par \par \par

## 2020-10-02 ENCOUNTER — APPOINTMENT (OUTPATIENT)
Dept: OTOLARYNGOLOGY | Facility: CLINIC | Age: 60
End: 2020-10-02

## 2021-01-13 ENCOUNTER — APPOINTMENT (OUTPATIENT)
Dept: OTOLARYNGOLOGY | Facility: CLINIC | Age: 61
End: 2021-01-13
Payer: COMMERCIAL

## 2021-01-13 VITALS
SYSTOLIC BLOOD PRESSURE: 128 MMHG | HEART RATE: 63 BPM | HEIGHT: 61 IN | WEIGHT: 135 LBS | BODY MASS INDEX: 25.49 KG/M2 | DIASTOLIC BLOOD PRESSURE: 82 MMHG

## 2021-01-13 DIAGNOSIS — Z78.9 OTHER SPECIFIED HEALTH STATUS: ICD-10-CM

## 2021-01-13 DIAGNOSIS — R06.02 SHORTNESS OF BREATH: ICD-10-CM

## 2021-01-13 DIAGNOSIS — K21.9 GASTRO-ESOPHAGEAL REFLUX DISEASE W/OUT ESOPHAGITIS: ICD-10-CM

## 2021-01-13 DIAGNOSIS — J34.2 DEVIATED NASAL SEPTUM: ICD-10-CM

## 2021-01-13 DIAGNOSIS — J30.2 OTHER SEASONAL ALLERGIC RHINITIS: ICD-10-CM

## 2021-01-13 DIAGNOSIS — R09.82 POSTNASAL DRIP: ICD-10-CM

## 2021-01-13 DIAGNOSIS — R09.81 NASAL CONGESTION: ICD-10-CM

## 2021-01-13 DIAGNOSIS — Z82.49 FAMILY HISTORY OF ISCHEMIC HEART DISEASE AND OTHER DISEASES OF THE CIRCULATORY SYSTEM: ICD-10-CM

## 2021-01-13 DIAGNOSIS — J30.9 ALLERGIC RHINITIS, UNSPECIFIED: ICD-10-CM

## 2021-01-13 DIAGNOSIS — R45.7 STATE OF EMOTIONAL SHOCK AND STRESS, UNSPECIFIED: ICD-10-CM

## 2021-01-13 PROCEDURE — 99214 OFFICE O/P EST MOD 30 MIN: CPT | Mod: 25

## 2021-01-13 PROCEDURE — 31231 NASAL ENDOSCOPY DX: CPT

## 2021-01-13 PROCEDURE — 99072 ADDL SUPL MATRL&STAF TM PHE: CPT

## 2021-01-13 RX ORDER — MOMETASONE 50 UG/1
50 SPRAY, METERED NASAL DAILY
Qty: 1 | Refills: 5 | Status: DISCONTINUED | COMMUNITY
Start: 2020-07-01 | End: 2021-01-13

## 2021-01-13 RX ORDER — CETIRIZINE HYDROCHLORIDE 10 MG/1
10 TABLET, FILM COATED ORAL DAILY
Qty: 30 | Refills: 5 | Status: ACTIVE | COMMUNITY
Start: 2021-01-13 | End: 1900-01-01

## 2021-01-13 RX ORDER — MOMETASONE 50 UG/1
50 SPRAY, METERED NASAL DAILY
Qty: 1 | Refills: 5 | Status: ACTIVE | COMMUNITY
Start: 2021-01-13 | End: 1900-01-01

## 2021-01-13 RX ORDER — AZELASTINE HYDROCHLORIDE 137 UG/1
0.1 SPRAY, METERED NASAL TWICE DAILY
Qty: 3 | Refills: 3 | Status: DISCONTINUED | COMMUNITY
Start: 2019-09-25 | End: 2021-01-13

## 2021-01-13 RX ORDER — AZELASTINE HYDROCHLORIDE 137 UG/1
0.1 SPRAY, METERED NASAL TWICE DAILY
Qty: 1 | Refills: 6 | Status: ACTIVE | COMMUNITY
Start: 2021-01-13 | End: 1900-01-01

## 2021-01-13 NOTE — PROCEDURE
[Image(s) Captured] : image(s) captured and filed [Video Captured] : video captured and filed [Topical Lidocaine] : topical lidocaine [Oxymetazoline HCl] : oxymetazoline HCl [Flexible Endoscope] : examined with the flexible endoscope [Serial Number: ___] : Serial Number: [unfilled] [Recalcitrant Symptoms] : recalcitrant symptoms  [Anatomical Abnormality] : anatomical abnormality [Anterior rhinoscopy insufficient to account for symptoms] : anterior rhinoscopy insufficient to account for symptoms [Allergic] : allergic signs [Delfina] : delfina [FreeTextEntry6] : Pre-op indication(s): Nasal congestion\par Post-op indication(s): Septum straight, allergic rhinitis\par Verbal consent obtained from patient.\par “Anterior rhinoscopy insufficient to account for symptoms” \par Details for procedure: \par Scope #: 196\par Type of scope:    flexible fiber optic telescope    X Rigid glass telescope \par Anesthesia and/or vasoconstriction was achieved topically by using: \par 4% Lidocaine spray   0.05% Oxymetazoline     Other ______ \par The following anatomic sites were directly examined in a sequential fashion: \par The scope was introduced in the nasal passage between the middle and inferior turbinates to exam the inferior portion of the middle meatus and the fontanelle, as well as the maxillary ostia. Next, the scope was passed medially and posteriorly to the middle turbinates to examine the sphenoethmoid recess and the superior turbinate region. \par Upon visualization the finders are as follows: \par Nasal Septum:   Normal    \par Bleeding site cauterized:    Anterior   left   right   Posterior   left   right \par Method:   Silver Nitrate   YAG Laser    Electrocautery ______ \par Right Side: \par * Mucosa: Normal\par * Mucous: Normal\par * Polyp: Normal\par * Inferior Turbinate: boggy\par * Middle Turbinate: Normal\par * Superior Turbinate: Normal\par * Inferior Meatus: Normal\par * Middle Meatus: Normal\par * Super Meatus: Normal\par * Sphenoethmoidal Recess: Normal\par Left Side: \par * Mucosa: Normal\par * Mucous: Normal\par * Polyp: Normal\par * Inferior Turbinate: boggy\par * Middle Turbinate: Normal\par * Superior Turbinate: Normal\par * Inferior Meatus: Normal\par * Middle Meatus: Normal\par * Super Meatus: Normal\par * Sphenoethmoidal Recess: Normal\par The patient tolerated the procedure well without any complications.\par \par \par

## 2021-01-13 NOTE — REASON FOR VISIT
[Subsequent Evaluation] : a subsequent evaluation for [FreeTextEntry2] : follow up s/p septoplasty, turbinectomy 10/29/2019.

## 2021-01-13 NOTE — PHYSICAL EXAM
[Nasal Endoscopy Performed] : nasal endoscopy was performed, see procedure section for findings [] : septum deviated to the left [Midline] : trachea located in midline position [Laryngoscopy Performed] : laryngoscopy was performed, see procedure section for findings [Normal] : no rashes [de-identified] : small area in right cheek [de-identified] : dry [de-identified] : allergic

## 2021-01-13 NOTE — HISTORY OF PRESENT ILLNESS
[de-identified] : 60 year old female follow up s/p septoplasty, turbinectomy 10/29/2019.  States the left side of her nose is obstructed since prior to the surgery and also has clear nasal discharge.  States at last visit July 1, 2020, was prescribed Nasonex daily with no relief.  Denies sinus pain, sinus pressure, post nasal drip.  Denies sinus infections in the past year. 
Patient/Caregiver provided printed discharge information.

## 2021-01-18 ENCOUNTER — INPATIENT (INPATIENT)
Facility: HOSPITAL | Age: 61
LOS: 2 days | Discharge: HOME | DRG: 247 | End: 2021-01-21
Attending: INTERNAL MEDICINE | Admitting: INTERNAL MEDICINE
Payer: COMMERCIAL

## 2021-01-18 VITALS
OXYGEN SATURATION: 98 % | HEIGHT: 61 IN | HEART RATE: 64 BPM | RESPIRATION RATE: 18 BRPM | WEIGHT: 134.92 LBS | DIASTOLIC BLOOD PRESSURE: 72 MMHG | SYSTOLIC BLOOD PRESSURE: 158 MMHG | TEMPERATURE: 98 F

## 2021-01-18 DIAGNOSIS — Z98.890 OTHER SPECIFIED POSTPROCEDURAL STATES: Chronic | ICD-10-CM

## 2021-01-18 DIAGNOSIS — Z95.5 PRESENCE OF CORONARY ANGIOPLASTY IMPLANT AND GRAFT: Chronic | ICD-10-CM

## 2021-01-18 LAB
ALBUMIN SERPL ELPH-MCNC: 4.4 G/DL — SIGNIFICANT CHANGE UP (ref 3.3–5)
ALP SERPL-CCNC: 69 U/L — SIGNIFICANT CHANGE UP (ref 40–120)
ALT FLD-CCNC: 44 U/L — SIGNIFICANT CHANGE UP (ref 10–45)
ANION GAP SERPL CALC-SCNC: 9 MMOL/L — SIGNIFICANT CHANGE UP (ref 5–17)
APTT BLD: 27.7 SEC — SIGNIFICANT CHANGE UP (ref 27.5–35.5)
AST SERPL-CCNC: 35 U/L — SIGNIFICANT CHANGE UP (ref 10–40)
BASOPHILS # BLD AUTO: 0.11 K/UL — SIGNIFICANT CHANGE UP (ref 0–0.2)
BASOPHILS NFR BLD AUTO: 1.1 % — SIGNIFICANT CHANGE UP (ref 0–2)
BILIRUB SERPL-MCNC: 0.2 MG/DL — SIGNIFICANT CHANGE UP (ref 0.2–1.2)
BUN SERPL-MCNC: 17 MG/DL — SIGNIFICANT CHANGE UP (ref 7–23)
CALCIUM SERPL-MCNC: 9.3 MG/DL — SIGNIFICANT CHANGE UP (ref 8.4–10.5)
CHLORIDE SERPL-SCNC: 102 MMOL/L — SIGNIFICANT CHANGE UP (ref 96–108)
CO2 SERPL-SCNC: 29 MMOL/L — SIGNIFICANT CHANGE UP (ref 22–31)
CREAT SERPL-MCNC: 0.72 MG/DL — SIGNIFICANT CHANGE UP (ref 0.5–1.3)
EOSINOPHIL # BLD AUTO: 0.28 K/UL — SIGNIFICANT CHANGE UP (ref 0–0.5)
EOSINOPHIL NFR BLD AUTO: 2.8 % — SIGNIFICANT CHANGE UP (ref 0–6)
GLUCOSE SERPL-MCNC: 127 MG/DL — HIGH (ref 70–99)
HCT VFR BLD CALC: 42 % — SIGNIFICANT CHANGE UP (ref 34.5–45)
HGB BLD-MCNC: 13.4 G/DL — SIGNIFICANT CHANGE UP (ref 11.5–15.5)
IMM GRANULOCYTES NFR BLD AUTO: 0.4 % — SIGNIFICANT CHANGE UP (ref 0–1.5)
INR BLD: 0.95 RATIO — SIGNIFICANT CHANGE UP (ref 0.88–1.16)
LYMPHOCYTES # BLD AUTO: 3.37 K/UL — HIGH (ref 1–3.3)
LYMPHOCYTES # BLD AUTO: 33.9 % — SIGNIFICANT CHANGE UP (ref 13–44)
MCHC RBC-ENTMCNC: 28.6 PG — SIGNIFICANT CHANGE UP (ref 27–34)
MCHC RBC-ENTMCNC: 31.9 GM/DL — LOW (ref 32–36)
MCV RBC AUTO: 89.7 FL — SIGNIFICANT CHANGE UP (ref 80–100)
MONOCYTES # BLD AUTO: 0.71 K/UL — SIGNIFICANT CHANGE UP (ref 0–0.9)
MONOCYTES NFR BLD AUTO: 7.1 % — SIGNIFICANT CHANGE UP (ref 2–14)
NEUTROPHILS # BLD AUTO: 5.44 K/UL — SIGNIFICANT CHANGE UP (ref 1.8–7.4)
NEUTROPHILS NFR BLD AUTO: 54.7 % — SIGNIFICANT CHANGE UP (ref 43–77)
NRBC # BLD: 0 /100 WBCS — SIGNIFICANT CHANGE UP (ref 0–0)
NT-PROBNP SERPL-SCNC: 81 PG/ML — SIGNIFICANT CHANGE UP (ref 0–300)
PLATELET # BLD AUTO: 305 K/UL — SIGNIFICANT CHANGE UP (ref 150–400)
POTASSIUM SERPL-MCNC: 3.9 MMOL/L — SIGNIFICANT CHANGE UP (ref 3.5–5.3)
POTASSIUM SERPL-SCNC: 3.9 MMOL/L — SIGNIFICANT CHANGE UP (ref 3.5–5.3)
PROT SERPL-MCNC: 7.4 G/DL — SIGNIFICANT CHANGE UP (ref 6–8.3)
PROTHROM AB SERPL-ACNC: 11.4 SEC — SIGNIFICANT CHANGE UP (ref 10.6–13.6)
RBC # BLD: 4.68 M/UL — SIGNIFICANT CHANGE UP (ref 3.8–5.2)
RBC # FLD: 12.9 % — SIGNIFICANT CHANGE UP (ref 10.3–14.5)
SARS-COV-2 RNA SPEC QL NAA+PROBE: SIGNIFICANT CHANGE UP
SODIUM SERPL-SCNC: 140 MMOL/L — SIGNIFICANT CHANGE UP (ref 135–145)
TROPONIN T, HIGH SENSITIVITY RESULT: <6 NG/L — SIGNIFICANT CHANGE UP (ref 0–51)
WBC # BLD: 9.95 K/UL — SIGNIFICANT CHANGE UP (ref 3.8–10.5)
WBC # FLD AUTO: 9.95 K/UL — SIGNIFICANT CHANGE UP (ref 3.8–10.5)

## 2021-01-18 PROCEDURE — 99220: CPT

## 2021-01-18 RX ORDER — PANTOPRAZOLE SODIUM 20 MG/1
40 TABLET, DELAYED RELEASE ORAL
Refills: 0 | Status: DISCONTINUED | OUTPATIENT
Start: 2021-01-18 | End: 2021-01-21

## 2021-01-18 RX ORDER — ACETAMINOPHEN 500 MG
650 TABLET ORAL ONCE
Refills: 0 | Status: COMPLETED | OUTPATIENT
Start: 2021-01-18 | End: 2021-01-19

## 2021-01-18 RX ORDER — HYDROCHLOROTHIAZIDE 25 MG
12.5 TABLET ORAL DAILY
Refills: 0 | Status: DISCONTINUED | OUTPATIENT
Start: 2021-01-18 | End: 2021-01-21

## 2021-01-18 RX ORDER — SIMVASTATIN 20 MG/1
20 TABLET, FILM COATED ORAL AT BEDTIME
Refills: 0 | Status: DISCONTINUED | OUTPATIENT
Start: 2021-01-18 | End: 2021-01-20

## 2021-01-18 RX ORDER — ASPIRIN/CALCIUM CARB/MAGNESIUM 324 MG
243 TABLET ORAL ONCE
Refills: 0 | Status: COMPLETED | OUTPATIENT
Start: 2021-01-18 | End: 2021-01-18

## 2021-01-18 RX ORDER — LISINOPRIL 2.5 MG/1
20 TABLET ORAL DAILY
Refills: 0 | Status: DISCONTINUED | OUTPATIENT
Start: 2021-01-18 | End: 2021-01-21

## 2021-01-18 RX ORDER — ASPIRIN/CALCIUM CARB/MAGNESIUM 324 MG
81 TABLET ORAL DAILY
Refills: 0 | Status: DISCONTINUED | OUTPATIENT
Start: 2021-01-18 | End: 2021-01-20

## 2021-01-18 RX ADMIN — Medication 243 MILLIGRAM(S): at 18:53

## 2021-01-18 NOTE — ED CDU PROVIDER INITIAL DAY NOTE - DETAILS
60 y.o F with PMHx HTN, HLD, CAD s/p (2009) stent c/o chest pain  Plan : 60 y.o F with PMHx HTN, HLD, CAD s/p (2009) stent c/o chest pain  Plan : frequent reeval, vitals q 4hrs, telemetry monitoring, Nuclear stress.

## 2021-01-18 NOTE — ED PROVIDER NOTE - OBJECTIVE STATEMENT
60 y.o F with PMHx HTN, HLD, and PSHx s/p stent presents with x2-3 weeks of chest heaviness radiating to left shoulder that worsened today associated with being lightheaded that was only today.  No cough, no fevers, no chills.  Symptoms worse with exertion (SoB worse than the CP).  No belly pain nausea or vomiting.  Has been taking all of her medications  as prescribed.  No other complaints, rest makes the symptoms better.  Pt stating (this is what it felt likje last time I got a stent).

## 2021-01-18 NOTE — ED PROVIDER NOTE - RAPID ASSESSMENT
60 y.o F with PMHx HTN, HLD, and PSHx s/p stent presents with x2-3 weeks of chest heaviness radiating to left shoulder that worsened today. Notes pain associated with SOB, dizziness, and nausea. Reports SOB with exertion, improved at rest. States feels similar to when she needed her stent. Endorses 81mg of aspirin. +fatigue. Denies fever, chills, cough, or recent COVID exposure.     Scribe Statement: Tressa DECKER Tiffany, attest that this documentation has been prepared under the direction and in the presence of Jazlyn Booker) 60 y.o F with PMHx HTN, HLD, and PSHx s/p stent presents with x2-3 weeks of chest heaviness radiating to left shoulder that worsened today. Notes pain associated with SOB, dizziness, and nausea. Reports SOB with exertion, improved at rest. States feels similar to when she needed her stent. Endorses 81mg of aspirin. +fatigue. Denies fever, chills, cough, or recent COVID exposure.     Scribe Statement: I, Eva Bustillos, attest that this documentation has been prepared under the direction and in the presence of Jazlyn Booker (PA)    Jazlyn Booker PA-C: The scribe's documentation has been prepared under my direction and personally reviewed by me in its entirety. I confirm that the note above accurately reflects history obtained.   Patient was rapidly assessed via telemedicine encounter; a limited history was obtained. The patient will be seen and further examined and worked up in the main ED and their care will be completed by the main ED team. Receiving team will follow up on labs, analgesia, any clinical imaging, and perform reassessment and disposition of the patient as clinically indicated. All decisions regarding the progression of care will be made at their discretion.

## 2021-01-18 NOTE — ED CDU PROVIDER INITIAL DAY NOTE - MEDICAL DECISION MAKING DETAILS
Kimberly: 60 year old female with stents s/p 2-3 weeks worse on exertion. will get labs, cxr, ekg, cardiac enzymes, stress test. cardiac monitoring, cards following, reasess.

## 2021-01-18 NOTE — ED CDU PROVIDER INITIAL DAY NOTE - OBJECTIVE STATEMENT
60 y.o F with PMHx HTN, HLD, and PSHx s/p stent presents with x2-3 weeks of chest heaviness radiating to left shoulder that worsened today associated with being lightheaded that was only today.  No cough, no fevers, no chills.  Symptoms worse with exertion (SoB worse than the CP).  No belly pain nausea or vomiting.  Has been taking all of her medications  as prescribed.  No other complaints, rest makes the symptoms better.  Pt stating (this is what it felt likje last time I got a stent). 60 y.o F with PMHx HTN, HLD, CAD s/p (2009) stent presents with x2-3 weeks of chest heaviness radiating to left shoulder that worsened today associated with being lightheaded that was only today.  No cough, no fevers, no chills.  Symptoms worse with exertion (SoB worse than the CP).  No belly pain nausea or vomiting.  Has been taking all of her medications  as prescribed.  No other complaints, rest makes the symptoms better.  Pt stating (this is what it felt like last time I got a stent).  In ED, patient had ekg no signs of acute ischemia, troponin <6, chest x ray no signs of acute pathology. ED discussed w/ Cardiology and Pt sent to CDU for frequent reeval, vitals q 4hrs, telemetry monitoring w/ Nuclear stress. Cardiologist, Dr. Derrell Lucero

## 2021-01-18 NOTE — ED CDU PROVIDER INITIAL DAY NOTE - PROGRESS NOTE DETAILS
CDU PROGRESS NOTE NIA SUMMERS: Pt resting comfortably, NAD, VSS. No events on telemetry. Pt states chest pain has resolved, but reports having 6/10 headache. Will give Tylenol 650mg po and closely monitor.

## 2021-01-18 NOTE — ED ADULT TRIAGE NOTE - CHIEF COMPLAINT QUOTE
Chest pain and tightness x2 weeks, intermittent, radiating down the L arm, endorses OLGUIN. Takes ASA daily (81mg).  PMHx Cardiac stent, HTN, HLD.

## 2021-01-18 NOTE — ED ADULT NURSE NOTE - OBJECTIVE STATEMENT
60 year old female with a PMH CAD w/ stents, HTN and HLD comes to the ED c/o chest pain/dizziness/drowsiness for the last 2 weeks. Pt describes the pain as a pressure like pain across right and left chest, no radiation, nonexertional/non-positional. Pt states today, she became weak and tired a/w mild chest pain prompting her to come to the ED. Pt states her chief complaint is very similar to that when she had stents placed 10 years ago. Pt is a/ox4, VSS, sensory/motor function intact, speaking coherently, follows commands and in NAD at this time. Lung sounds are clear and equal b/l with no labored breathing noted. Abdomen is soft, nontender and nondistended. Peripheral pulses are strong and equal b/l with no edema noted. Skin is warm, dry and intact. Pt denies CP/SOB, f/c, n/v/d, dizziness/lightheadedness, numbness/tingling/weakness, abdominal pain/back pain, hematuria/dysuria/frequency at this time.

## 2021-01-18 NOTE — ED PROVIDER NOTE - CLINICAL SUMMARY MEDICAL DECISION MAKING FREE TEXT BOX
Deon: 60 year old female with cad, htn, hld, here with 2-3 weeks chest pain radiatin gto left shoulder. will get labs, cxr, ekg, cardiac enzymes, will speak with cards. similar pain in the past that required stent at that time.

## 2021-01-18 NOTE — ED ADULT NURSE NOTE - ED STAT RN HANDOFF DETAILS
Detail Level: Zone Detail Level: Simple Detail Level: Detailed Detail Level: Generalized report given to SUMMER Reid

## 2021-01-19 DIAGNOSIS — R07.9 CHEST PAIN, UNSPECIFIED: ICD-10-CM

## 2021-01-19 LAB
A1C WITH ESTIMATED AVERAGE GLUCOSE RESULT: 5.6 % — SIGNIFICANT CHANGE UP (ref 4–5.6)
CHOLEST SERPL-MCNC: 169 MG/DL — SIGNIFICANT CHANGE UP
ESTIMATED AVERAGE GLUCOSE: 114 MG/DL — SIGNIFICANT CHANGE UP (ref 68–114)
HDLC SERPL-MCNC: 45 MG/DL — LOW
LIPID PNL WITH DIRECT LDL SERPL: 92 MG/DL — SIGNIFICANT CHANGE UP
NON HDL CHOLESTEROL: 124 MG/DL — SIGNIFICANT CHANGE UP
TRIGL SERPL-MCNC: 160 MG/DL — HIGH
TROPONIN T, HIGH SENSITIVITY RESULT: <6 NG/L — SIGNIFICANT CHANGE UP (ref 0–51)

## 2021-01-19 PROCEDURE — 93018 CV STRESS TEST I&R ONLY: CPT

## 2021-01-19 PROCEDURE — 99217: CPT

## 2021-01-19 PROCEDURE — 99223 1ST HOSP IP/OBS HIGH 75: CPT

## 2021-01-19 PROCEDURE — 78452 HT MUSCLE IMAGE SPECT MULT: CPT | Mod: 26

## 2021-01-19 PROCEDURE — 93016 CV STRESS TEST SUPVJ ONLY: CPT

## 2021-01-19 RX ORDER — AZELASTINE 137 UG/1
2 SPRAY, METERED NASAL
Qty: 0 | Refills: 0 | DISCHARGE

## 2021-01-19 RX ORDER — OMEPRAZOLE 10 MG/1
1 CAPSULE, DELAYED RELEASE ORAL
Qty: 0 | Refills: 0 | DISCHARGE

## 2021-01-19 RX ORDER — LISINOPRIL/HYDROCHLOROTHIAZIDE 10-12.5 MG
1 TABLET ORAL
Qty: 0 | Refills: 0 | DISCHARGE

## 2021-01-19 RX ORDER — ENOXAPARIN SODIUM 100 MG/ML
40 INJECTION SUBCUTANEOUS DAILY
Refills: 0 | Status: DISCONTINUED | OUTPATIENT
Start: 2021-01-19 | End: 2021-01-21

## 2021-01-19 RX ORDER — ASPIRIN/CALCIUM CARB/MAGNESIUM 324 MG
1 TABLET ORAL
Qty: 0 | Refills: 0 | DISCHARGE

## 2021-01-19 RX ORDER — CHOLECALCIFEROL (VITAMIN D3) 125 MCG
1 CAPSULE ORAL
Qty: 0 | Refills: 0 | DISCHARGE

## 2021-01-19 RX ORDER — MOMETASONE FUROATE 50 UG/1
2 SPRAY NASAL
Qty: 0 | Refills: 0 | DISCHARGE

## 2021-01-19 RX ORDER — ATENOLOL 25 MG/1
50 TABLET ORAL DAILY
Refills: 0 | Status: DISCONTINUED | OUTPATIENT
Start: 2021-01-19 | End: 2021-01-21

## 2021-01-19 RX ORDER — ATENOLOL 25 MG/1
1 TABLET ORAL
Qty: 0 | Refills: 0 | DISCHARGE

## 2021-01-19 RX ORDER — LEVOCETIRIZINE DIHYDROCHLORIDE 0.5 MG/ML
1 SOLUTION ORAL
Qty: 0 | Refills: 0 | DISCHARGE

## 2021-01-19 RX ORDER — CETIRIZINE HYDROCHLORIDE 10 MG/1
1 TABLET ORAL
Qty: 0 | Refills: 0 | DISCHARGE

## 2021-01-19 RX ORDER — CHOLECALCIFEROL (VITAMIN D3) 125 MCG
1000 CAPSULE ORAL DAILY
Refills: 0 | Status: DISCONTINUED | OUTPATIENT
Start: 2021-01-19 | End: 2021-01-21

## 2021-01-19 RX ORDER — ACETAMINOPHEN 500 MG
650 TABLET ORAL ONCE
Refills: 0 | Status: COMPLETED | OUTPATIENT
Start: 2021-01-19 | End: 2021-01-19

## 2021-01-19 RX ORDER — ASPIRIN/CALCIUM CARB/MAGNESIUM 324 MG
81 TABLET ORAL DAILY
Refills: 0 | Status: DISCONTINUED | OUTPATIENT
Start: 2021-01-19 | End: 2021-01-21

## 2021-01-19 RX ADMIN — PANTOPRAZOLE SODIUM 40 MILLIGRAM(S): 20 TABLET, DELAYED RELEASE ORAL at 08:20

## 2021-01-19 RX ADMIN — Medication 650 MILLIGRAM(S): at 00:22

## 2021-01-19 RX ADMIN — Medication 81 MILLIGRAM(S): at 20:40

## 2021-01-19 RX ADMIN — Medication 10 MILLIGRAM(S): at 00:23

## 2021-01-19 RX ADMIN — SIMVASTATIN 20 MILLIGRAM(S): 20 TABLET, FILM COATED ORAL at 00:23

## 2021-01-19 RX ADMIN — SIMVASTATIN 20 MILLIGRAM(S): 20 TABLET, FILM COATED ORAL at 20:40

## 2021-01-19 RX ADMIN — Medication 650 MILLIGRAM(S): at 21:27

## 2021-01-19 RX ADMIN — Medication 10 MILLIGRAM(S): at 21:27

## 2021-01-19 NOTE — H&P ADULT - NSHPPHYSICALEXAM_GEN_ALL_CORE
T(F): 97.8 (01-19-21 @ 20:33), Max: 98.7 (01-19-21 @ 07:45)  HR: 56 (01-19-21 @ 20:33) (53 - 67)  BP: 127/79 (01-19-21 @ 20:33) (107/72 - 148/83)  RR: 17 (01-19-21 @ 20:33) (17 - 18)  SpO2: 98% (01-19-21 @ 20:33) (98% - 100%)    PHYSICAL EXAM:  GENERAL: NAD, well-developed  HEAD:  Atraumatic, Normocephalic  EYES: EOMI, PERRLA, conjunctiva and sclera clear  NECK: Supple, No JVD  CHEST/LUNG: Clear to auscultation bilaterally; No wheeze  HEART: Regular rate and rhythm; No murmurs, rubs, or gallops  ABDOMEN: Soft, Nontender, Nondistended; Bowel sounds present  EXTREMITIES:  2+ Peripheral Pulses, No clubbing, cyanosis, or edema  PSYCH: AAOx3  NEUROLOGY: non-focal  SKIN: No rashes or lesions

## 2021-01-19 NOTE — CONSULT NOTE ADULT - SUBJECTIVE AND OBJECTIVE BOX
Patient seen and evaluated at bedside    Chief Complaint: CP    HPI: Briefly, 61 y/o F w/ PMH of HTN, HTLD, CAD s/p PCI () c/o CP x 2W. Pt states that over last two weeks, she has noted intermittent CP. First ep occurred while walking. Pain is located substernally and radiates to her L shoulder. Pain described as moderate in intensity and self-resolved after several mins. Pt states that she has had mult recurrences of chest pain, typically while exerting herself, although occasionally at rest. She reports associated dyspnea, but no diaphoresis, nausea, nor vomiting. Of note, she notes associated fatigue; and this sensation is similar to prev ep that prompted stent. She had NST, which returned abnml.      PMHx:   Deviated septum    BOLA on CPAP    Coronary Stent    Hypercholesterolemia    Depression    Anxiety Disorder    Coronary Stent    CAD (Coronary Artery Disease)    GERD (Gastroesophageal Reflux    Hypertension    Hypertension        PSHx:   S/P primary angioplasty with coronary stent    S/P colonoscopy    S/P Lymph Node Biopsy    Tubal Ligation        Allergies:  No Known Allergies      Home Meds: Reviewed    Current Medications:   aspirin  chewable 81 milliGRAM(s) Oral daily  hydrochlorothiazide 12.5 milliGRAM(s) Oral daily  lisinopril 20 milliGRAM(s) Oral daily  pantoprazole    Tablet 40 milliGRAM(s) Oral before breakfast  PARoxetine 10 milliGRAM(s) Oral daily  simvastatin 20 milliGRAM(s) Oral at bedtime      FAMILY HISTORY:  Family history of early CAD (Mother)  Mom at ag 50  of MI        Social History:  Smoking History: Denies  Alcohol Use: Social  Drug Use: Denies    REVIEW OF SYSTEMS:  CONSTITUTIONAL: No weakness, fevers or chills  EYES/ENT: No visual changes;  No dysphagia  NECK: No pain or stiffness  RESPIRATORY: No cough, wheezing, hemoptysis; No shortness of breath  CARDIOVASCULAR: +CP  GASTROINTESTINAL: No abdominal or epigastric pain. No nausea, vomiting, or hematemesis; No diarrhea or constipation. No melena or hematochezia.  BACK: No back pain  GENITOURINARY: No dysuria, frequency or hematuria  NEUROLOGICAL: No numbness or weakness  SKIN: No itching, burning, rashes, or lesions   All other review of systems is negative unless indicated above.    Physical Exam:  T(F): 98.1 (-), Max: 98.7 ()  HR: 67 () (53 - 67)  BP: 136/75 () (107/72 - 148/83)  RR: 18 ()  SpO2: 98% ()  Gen: NAD.  HEENT: NCAT. PERRLA b/l.  Neck: No JVP elev.  CV: Normal S1, S2. RRR. No MRG.  Chest: CTAB. No WRR.  Abd: +BSx4. Soft. NTND.  Ext: No LE edema.  Skin: No cyanosis.    Cardiovascular Diagnostic Testing:    ECG: Personally reviewed: NSR    Stress Testing: < from: Nuclear Stress Test-Pharmacologic (Nuclear Stress Test-Pharmacologic .) (21 @ 12:55) >  IMPRESSIONS:Abnormal Study  * Chest Pain: No chest pain with administration of  Regadenoson.  *Symptom: Shortness of breath.  * HR Response: Appropriate.  * BP Response: Appropriate.  * Heart Rhythm: Sinus Bradycardia - 56 BPM.  * Baseline ECG: Nonspecific ST-T wave abnormality.  * ECG Changes: No significant ischemic ST segment changes  beyond baseline abnormalities.  * Arrhythmia: None.  * The left ventricle was small in size.  * There are medium-sized, mild defects in the distal  anterior, distal anteroseptal, and anteroapical walls that  are reversible suggestive of ischemia.  * There is a small, mild defect in the basal inferoseptal  wall that is reversible suggestive of ischemia.  * Post-stress gated wall motion analysis was performed  (LVEF > 70%;LVEDV = 44 ml.), revealing normal LV function.    Imaging:    CXR: Personally reviewed. Clear lungs.    Labs: Personally reviewed                        13.4   9.95  )-----------( 305      ( 2021 19:08 )             42.0         140  |  102  |  17  ----------------------------<  127<H>  3.9   |  29  |  0.72    Ca    9.3      2021 19:08    TPro  7.4  /  Alb  4.4  /  TBili  0.2  /  DBili  x   /  AST  35  /  ALT  44  /  AlkPhos  69      PT/INR - ( 2021 19:08 )   PT: 11.4 sec;   INR: 0.95 ratio         PTT - ( 2021 19:08 )  PTT:27.7 sec    CARDIAC MARKERS ( 2021 00:10 )  <6 ng/L / x     / x     / x     / x     / x      CARDIAC MARKERS ( 2021 19:08 )  <6 ng/L / x     / x     / 157 U/L / x     / 2.0 ng/mL        Serum Pro-Brain Natriuretic Peptide: 81 pg/mL ( @ 19:08)    Total Cholesterol: 169  LDL: --  HDL: 45  TG: 160

## 2021-01-19 NOTE — ED ADULT NURSE REASSESSMENT NOTE - URINE COLOR
Patient Education     Acute Otitis Media with Infection (Child)    Your child has a middle ear infection (acute otitis media). It is caused by bacteria or fungi. The middle ear is the space behind the eardrum. The eustachian tube connects the ear to the nasal passage. The eustachian tubes help drain fluid from the ears. They also keep the air pressure equal inside and outside the ears. These tubes are shorter and more horizontal in children. This makes it more likely for the tubes to become blocked. A blockage lets fluid and pressure build up in the middle ear. Bacteria or fungi can grow in this fluid and cause an ear infection. This infection is commonly known as an earache.  The main symptom of an ear infection is ear pain. Other symptoms may include pulling at the ear, being more fussy than usual, decreased appetite, and vomiting or diarrhea. Your child’s hearing may also be affected. Your child may have had a respiratory infection first.  An ear infection may clear up on its own. Or your child may need to take medicine. After the infection goes away, your child may still have fluid in the middle ear. It may take weeks or months for this fluid to go away. During that time, your child may have temporary hearing loss. But all other symptoms of the earache should be gone.  Home care  Follow these guidelines when caring for your child at home:  · The healthcare provider will likely prescribe medicines for pain. The provider may also prescribe antibiotics or antifungals to treat the infection. These may be liquid medicines to give by mouth. Or they may be ear drops. Follow the provider’s instructions for giving these medicines to your child.  · Because ear infections can clear up on their own, the provider may suggest waiting for a few days before giving your child medicines for infection.  · To reduce pain, have your child rest in an upright position. Hot or cold compresses held against the ear may help ease  pain.  · Keep the ear dry. Have your child wear a shower cap when bathing.  To help prevent future infections:  · Don't smoke near your child. Secondhand smoke raises the risk for ear infections in children.  · Make sure your child gets all appropriate vaccines.  · Do not bottle-feed while your baby is lying on his or her back. (This position can cause middle ear infections because it allows milk to run into the eustachian tubes.)      · If you breastfeed, continue until your child is 6 to 12 months of age.  To apply ear drops:  1. Put the bottle in warm water if the medicine is kept in the refrigerator. Cold drops in the ear are uncomfortable.  2. Have your child lie down on a flat surface. Gently hold your child’s head to 1 side.  3. Remove any drainage from the ear with a clean tissue or cotton swab. Clean only the outer ear. Don’t put the cotton swab into the ear canal.  4. Straighten the ear canal by gently pulling the earlobe up and back.  5. Keep the dropper a half-inch above the ear canal. This will keep the dropper from becoming contaminated. Put the drops against the side of the ear canal.  6. Have your child stay lying down for 2 to 3 minutes. This gives time for the medicine to enter the ear canal. If your child doesn’t have pain, gently massage the outer ear near the opening.  7. Wipe any extra medicine away from the outer ear with a clean cotton ball.  Follow-up care  Follow up with your child’s healthcare provider as directed. Your child will need to have the ear rechecked to make sure the infection has gone away. Check with the healthcare provider to see when they want to see your child.  Special note to parents  If your child continues to get earaches, he or she may need ear tubes. The provider will put small tubes in your child’s eardrum to help keep fluid from building up. This procedure is a simple and works well.  When to seek medical advice  Unless advised otherwise, call your child's  healthcare provider if:  · Your child is 3 months old or younger and has a fever of 100.4°F (38°C) or higher. Your child may need to see a healthcare provider.  · Your child is of any age and has fevers higher than 104°F (40°C) that come back again and again.  Call your child's healthcare provider for any of the following:  · New symptoms, especially swelling around the ear or weakness of face muscles  · Severe pain  · Infection seems to get worse, not better   · Neck pain  · Your child acts very sick or not himself or herself  · Fever or pain do not improve with antibiotics after 48 hours  Date Last Reviewed: 10/1/2017  © 3671-2308 [employer]. [address] [place] [address], [place] [address]. All rights reserved. This information is not intended as a substitute for professional medical care. Always follow your healthcare professional's instructions.            yellow

## 2021-01-19 NOTE — H&P ADULT - HISTORY OF PRESENT ILLNESS
60 y.o F with PMHx HTN, HLD, and PSHx s/p stent presents with x2-3 weeks of chest heaviness radiating to left shoulder that worsened today. Notes pain associated with SOB, dizziness, and nausea. Reports SOB with exertion, improved at rest. States feels similar to when she needed her stent in 2009. Endorses 81mg of aspirin. +fatigue. Denies fever, chills, cough, or recent COVID exposure.

## 2021-01-19 NOTE — ED ADULT NURSE REASSESSMENT NOTE - NS ED NURSE REASSESS COMMENT FT1
17.00 Has abnormal stress test  Pt is admitted for further workup awaiting bed has no chest pain
1853 IVL placed and meds given as ordered Jaya
Pt received from SUMMER Yi. Pt oriented to CDU & plan of care was discussed. Pt A&O x 4. Pt in CDU for frequent reeval, vitals q 4hrs, telemetry monitoring w/ Nuclear stress. Pt c/o 6/10 headache will medicate as per MAR. Pt denies any chest pain, SOB, dizziness or palpitations as of now. Pt on a cardiac monitor in sinus tony, HR in 50's. Pt aware no tot have any caffeine d/t stress in am. V/S stable, pt afebrile,  IV in place, patent and free of signs of infiltration. Pt resting in bed. Safety & comfort measures maintained. Call bell in reach. Will continue to monitor.
Pt received from SUMMER Wong. Pt oriented to CDU & plan of care was discussed. Pt denies any chest pain, SOB, dizziness or palpitations. Pt endorses 7/10 headache. Medicated as per MAR. Safety & comfort measures maintained. Call bell in reach. Will continue to monitor.
07.00 Am Received the Pt from  SUMMER Kemp  Pt is Observed for Chest pain for Nuclear stress test  Received the Pt A&OX 4 obeys commands Ira N/V/D fever chills cp SOB   Comfort care & safety measures continued  IV site looks clean & dry no signs of infiltration noted pt denies  pain IV site .  Pt is advised to call for help  call bell with in the reach pt verbalized the understanding .  pending CDU  MD prather . GCS 15/15 A&OX 4 PERRLA  size 3 Strong upper & lower extremities steady gait   No facial droop  No Hand Leg drop denies numbness tingling Continue to monitor  08.30 Pt went for nuclear stress test

## 2021-01-19 NOTE — ED CDU PROVIDER DISPOSITION NOTE - NSFOLLOWUPINSTRUCTIONS_ED_ALL_ED_FT
As per Cardiology..........  1. Follow up with your PMD within 48-72 hours.   You may schedule appointment with Cardiology clinic this week by calling [phone]  2. Show copies of your reports given to you. Take all of your other medications as previously prescribed.   3. Worsening or continued chest pain, shortness of breath, weakness, return to ED.

## 2021-01-19 NOTE — ED ADULT NURSE REASSESSMENT NOTE - GENERAL PATIENT STATE
comfortable appearance/cooperative
comfortable appearance/cooperative/smiling/interactive
comfortable appearance

## 2021-01-19 NOTE — ED CDU PROVIDER SUBSEQUENT DAY NOTE - HISTORY
CDU PROGRESS NOTE NIA SUMMERS: Pt resting comfortably, feeling well without complaint. NAD, No events on telemetry. Will continue to monitor, plan for Nuclear stress in am. Cardiology following.

## 2021-01-19 NOTE — CONSULT NOTE ADULT - ASSESSMENT
61 y/o F w/ PMH of HTN, HLD, CAD s/p PCI (2009) c/o CP x 2W.    #CP  -Concern for CAD as cause given description and known hx  -NST reviewed; abnml  -Trops neg x 2  -Given concern for UA, would load w/ ASA 325mg and clopidogrel 600mg  -C/w daily maintenance thereafter  -Medically optimize by incr atenolol to 100mg daily  -C/w home med simva 20, but would consider incr to atorva 40  -Given above, plan for [facility] w/ Dr. Derrell Lucero tomorrow    #Will d/w attg in AM    Rosendo Kramer MD  Cardiology Fellow  [phone]  All Cardiology service information can be found 24/7 on [email or web address], password: [email or web address] 59 y/o F w/ PMH of HTN, HLD, CAD s/p PCI (2009) c/o CP x 2W.    #CP  -Concern for CAD as cause given description and known hx  -NST reviewed; abnml  -Trops neg x 2  -Given concern for UA, would load w/ ASA 325mg and clopidogrel 600mg  -C/w daily maintenance thereafter  -Medically optimize by incr atenolol to 100mg daily  -dc home med simva 20, due to her hx of CAD increase to high intensity statin lipitor 40mg daily  refer to cardiac rehab  refer to nutrition    -Given above, plan for [facility] w/ Dr. Derrell Lucero tomorrow    #Will d/w attg in AM    Rosendo Kramer MD  Cardiology Fellow  [phone]  All Cardiology service information can be found 24/7 on [email or web address], password: [email or web address]    [facility] with PCI,   dc meds   ASA 81mg   plavix 75mg   lipitor 40mg    atenolol 50 mg oral tablet: 1 tab(s) orally once a day (19 Jan 2021 21:44)  azelastine 137 mcg/inh (0.1%) nasal spray: 2 spray(s) in each nostril 2 times a day (19 Jan 2021 21:44)  cetirizine 10 mg oral tablet: 1 tab(s) orally once a day (at bedtime) (19 Jan 2021 21:44)  lisinopril-hydrochlorothiazide 20 mg-12.5 mg oral tablet: 1 tab(s) orally once a day (19 Jan 2021 21:44)  mometasone 50 mcg/inh nasal spray: 2 spray(s) in each nostril once a day (19 Jan 2021 21:44)  omeprazole 40 mg oral delayed release capsule: 1 cap(s) orally once a day (19 Jan 2021 21:44)  Paxil 10 mg oral tablet: 1 tab(s) orally once a day (in the evening) (19 Jan 2021 21:44)  Vitamin D3 1000 intl units (25 mcg) oral tablet: 1 tab(s) orally once a day (19 Jan 2021 21:44)      Patient seen and examined with the fellow, the note above has been edited to reflect my independent history, physical exam, assessment and plan.      Yoni Mccord MD, PhD  Cardiology Attending  [facility]    For day time coverage Mon-Fri see Non-Service Consult Attending on [email or web address], password: [email or web address]; daytime weekends covered by general cardiology consult service attending.)

## 2021-01-19 NOTE — ED ADULT NURSE REASSESSMENT NOTE - NEURO SENSATION
Anesthesia Post Evaluation    Patient: Tee Aranda    Procedure(s) Performed: Procedure(s) (LRB):  EGD (ESOPHAGOGASTRODUODENOSCOPY) (N/A)    Final Anesthesia Type: general      Patient location during evaluation: ICU  Patient participation: Yes- Able to Participate  Level of consciousness: awake and alert and oriented  Post-procedure vital signs: reviewed and stable  Pain management: adequate  Airway patency: patent    PONV status at discharge: No PONV  Anesthetic complications: no      Cardiovascular status: blood pressure returned to baseline and hemodynamically stable  Respiratory status: unassisted  Hydration status: euvolemic  Follow-up not needed.          Vitals Value Taken Time   /64 12/28/20 1202   Temp  12/28/20 1247   Pulse 72 12/28/20 1247   Resp 20 12/28/20 1247   SpO2 99 % 12/28/20 1247   Vitals shown include unvalidated device data.      No case tracking events are documented in the log.      Pain/Dio Score: No data recorded      
sensory intact

## 2021-01-19 NOTE — ED CDU PROVIDER SUBSEQUENT DAY NOTE - MEDICAL DECISION MAKING DETAILS
Lamar Moore MD - Attending Physician: Pt here with chest pain, not present on exam this am. Initial ED eval neg. Awaiting Stress for dispo planning

## 2021-01-19 NOTE — ED CDU PROVIDER DISPOSITION NOTE - CLINICAL COURSE
60 y.o F with PMHx HTN, HLD, CAD s/p (2009) stent presents with x2-3 weeks of chest heaviness radiating to left shoulder that worsened today associated with being lightheaded that was only today.  No cough, no fevers, no chills.  Symptoms worse with exertion (SoB worse than the CP).  No belly pain nausea or vomiting.  Has been taking all of her medications  as prescribed.  No other complaints, rest makes the symptoms better.  Pt stating (this is what it felt like last time I got a stent).  In ED, patient had ekg no signs of acute ischemia, troponin x2 negative, Covid Negative, chest x ray no signs of acute pathology. ED discussed w/ Cardiology and Pt sent to CDU for frequent reeval, vitals q 4hrs, telemetry monitoring w/ Nuclear stress. Cardiologist, Dr. Derrell Lucero 60 y.o F with PMHx HTN, HLD, CAD s/p (2009) stent presents with x2-3 weeks of chest heaviness radiating to left shoulder that worsened today associated with being lightheaded that was only today.  No cough, no fevers, no chills.  Symptoms worse with exertion (SoB worse than the CP).  No belly pain nausea or vomiting.  Has been taking all of her medications  as prescribed.  No other complaints, rest makes the symptoms better.  Pt stating (this is what it felt like last time I got a stent).  In ED, patient had ekg no signs of acute ischemia, troponin x2 negative, Covid Negative, chest x ray no signs of acute pathology. ED discussed w/ Cardiology and Pt sent to CDU for frequent reeval, vitals q 4hrs, telemetry monitoring w/ Nuclear stress. Cardiologist, Dr. Derrell Lucero  In CDU Stress test abnormal: Medium-sized, mild defects in the distal anterior, distal anteroseptal, and anteroapical walls thatare reversible suggestive of ischemia. Small, mild defect in the basal inferoseptal wall that is reversible suggestive of ischemia. Normal LV function (LVEF > 70%;LVEDV = 44 ml.). Called the cardiology fellow, requesting call back as is currently in an emergency. Pt evaluated by cardiology fellow who d/w Dr. Lucero, requesting admission for cardiac catheterization. Pt ready and stable at time of admission.

## 2021-01-19 NOTE — ED ADULT NURSE REASSESSMENT NOTE - COMFORT CARE
darkened lights/plan of care explained
meal provided/plan of care explained/po fluids offered
ambulated to bathroom

## 2021-01-19 NOTE — ED CDU PROVIDER DISPOSITION NOTE - ATTENDING CONTRIBUTION TO CARE
CDU DISCHARGE NOTE - Dr. Moore Attending : I have personally seen and examined this patient. I have discussed the case with the ACP. I have reviewed all pertinent clinical information, including history, physical exam, plan and the ACP’s note and agree except as noted.  Patient requires admission given +Stress. HDS, exam benign

## 2021-01-19 NOTE — H&P ADULT - ASSESSMENT
60 y.o F with PMHx HTN, HLD, and PSHx s/p stent presents with x2-3 weeks of chest heaviness radiating to left shoulder that worsened today. Notes pain associated with SOB, dizziness, and nausea. Reports SOB with exertion, improved at rest. States feels similar to when she needed her stent in 2009. Endorses 81mg of aspirin. +fatigue. Denies fever, chills, cough, or recent COVID exposure.    Plan: cont outptn [facility] in am by dr arceo. dvt ppx w sc Lovenox. ptn presently asymptomatic

## 2021-01-19 NOTE — ED CDU PROVIDER SUBSEQUENT DAY NOTE - PROGRESS NOTE DETAILS
CDU PROGRESS NOTE PA MELINA: Pt resting comfortably, NAD, VSS. No events on telemetry. Pt seen at bedside. Pt in NAD, comfortable. VS stable from last reading. No events on tele. Pt has no complaints at this time. Denies CP and SOB. Pt aware of plan for today. Will continue to monitor. Pt back from stress. Pt in NAD, VSS, no events on tele. Pt at stress Pt in NAD, VSS, no events on tele.   Stress test result is abnormal: Medium-sized, mild defects in the distal anterior, distal anteroseptal, and anteroapical walls that are reversible suggestive of ischemia. Small, mild defect in the basal inferoseptal wall that is reversible suggestive of ischemia. Normal LV function (LVEF > 70%;LVEDV = 44 ml.).   I called the cardiology fellow, requesting call back as is currently in an emergency. Pt in NAD, VSS, no events on tele.   Stress test result is abnormal: Medium-sized, mild defects in the distal anterior, distal anteroseptal, and anteroapical walls that are reversible suggestive of ischemia. Small, mild defect in the basal inferoseptal wall that is reversible suggestive of ischemia. Normal LV function (LVEF > 70%;LVEDV = 44 ml.).   d/w cardiology fellow who will see pt. Pt evaluated by cardiology who requests admission for cardiac cath tomorrow. d/w academic hospitalist who states admission should got to Dr. Garcia. d/w Dr. Garcia who accepts admission. Pt in NAD, VSS, no events on tele. Plan d/w pt, all questions answered, pt ready and stable for admission.

## 2021-01-20 ENCOUNTER — TRANSCRIPTION ENCOUNTER (OUTPATIENT)
Age: 61
End: 2021-01-20

## 2021-01-20 LAB
HCV AB S/CO SERPL IA: 0.09 S/CO — SIGNIFICANT CHANGE UP (ref 0–0.99)
HCV AB SERPL-IMP: SIGNIFICANT CHANGE UP
SARS-COV-2 IGG SERPL QL IA: NEGATIVE — SIGNIFICANT CHANGE UP
SARS-COV-2 IGM SERPL IA-ACNC: <0.1 INDEX — SIGNIFICANT CHANGE UP

## 2021-01-20 PROCEDURE — 93458 L HRT ARTERY/VENTRICLE ANGIO: CPT | Mod: 26,59

## 2021-01-20 PROCEDURE — 93571 IV DOP VEL&/PRESS C FLO 1ST: CPT | Mod: 26,LD

## 2021-01-20 PROCEDURE — 92928 PRQ TCAT PLMT NTRAC ST 1 LES: CPT | Mod: LD

## 2021-01-20 PROCEDURE — 99152 MOD SED SAME PHYS/QHP 5/>YRS: CPT

## 2021-01-20 PROCEDURE — 93010 ELECTROCARDIOGRAM REPORT: CPT

## 2021-01-20 RX ORDER — ASPIRIN/CALCIUM CARB/MAGNESIUM 324 MG
1 TABLET ORAL
Qty: 30 | Refills: 0
Start: 2021-01-20 | End: 2021-02-18

## 2021-01-20 RX ORDER — ACETAMINOPHEN 500 MG
650 TABLET ORAL EVERY 6 HOURS
Refills: 0 | Status: DISCONTINUED | OUTPATIENT
Start: 2021-01-20 | End: 2021-01-21

## 2021-01-20 RX ORDER — ASPIRIN/CALCIUM CARB/MAGNESIUM 324 MG
1 TABLET ORAL
Qty: 0 | Refills: 0 | DISCHARGE

## 2021-01-20 RX ORDER — TICAGRELOR 90 MG/1
90 TABLET ORAL EVERY 12 HOURS
Refills: 0 | Status: DISCONTINUED | OUTPATIENT
Start: 2021-01-21 | End: 2021-01-21

## 2021-01-20 RX ORDER — TICAGRELOR 90 MG/1
1 TABLET ORAL
Qty: 180 | Refills: 3
Start: 2021-01-20 | End: 2022-01-14

## 2021-01-20 RX ORDER — TICAGRELOR 90 MG/1
90 TABLET ORAL EVERY 12 HOURS
Refills: 0 | Status: DISCONTINUED | OUTPATIENT
Start: 2021-01-20 | End: 2021-01-20

## 2021-01-20 RX ORDER — ATORVASTATIN CALCIUM 80 MG/1
80 TABLET, FILM COATED ORAL AT BEDTIME
Refills: 0 | Status: DISCONTINUED | OUTPATIENT
Start: 2021-01-20 | End: 2021-01-21

## 2021-01-20 RX ADMIN — ATENOLOL 50 MILLIGRAM(S): 25 TABLET ORAL at 05:37

## 2021-01-20 RX ADMIN — Medication 10 MILLIGRAM(S): at 21:39

## 2021-01-20 RX ADMIN — PANTOPRAZOLE SODIUM 40 MILLIGRAM(S): 20 TABLET, DELAYED RELEASE ORAL at 05:37

## 2021-01-20 RX ADMIN — ATORVASTATIN CALCIUM 80 MILLIGRAM(S): 80 TABLET, FILM COATED ORAL at 21:39

## 2021-01-20 RX ADMIN — Medication 1000 UNIT(S): at 11:39

## 2021-01-20 RX ADMIN — LISINOPRIL 20 MILLIGRAM(S): 2.5 TABLET ORAL at 05:37

## 2021-01-20 RX ADMIN — Medication 12.5 MILLIGRAM(S): at 05:36

## 2021-01-20 RX ADMIN — Medication 81 MILLIGRAM(S): at 05:37

## 2021-01-20 NOTE — CHART NOTE - NSCHARTNOTEFT_GEN_A_CORE
Removal of Femoral Sheath by NP Shiva    Pulses in the right lower extremity are palpable. The patient was placed in the supine position. The insertion site was identified and the sutures were removed per protocol.  The __5__ [place] femoral sheath was then removed. Direct pressure was applied for  __20____ minutes.     Monitoring of the right groin and both lower extremities including neuro-vascular checks and vital signs every 15 minutes x 4, then every 30 minutes x 2, then every 1 hour was ordered.    Complications: None/Other    Comments:

## 2021-01-21 VITALS
HEART RATE: 64 BPM | OXYGEN SATURATION: 98 % | SYSTOLIC BLOOD PRESSURE: 146 MMHG | DIASTOLIC BLOOD PRESSURE: 83 MMHG | RESPIRATION RATE: 16 BRPM | TEMPERATURE: 98 F

## 2021-01-21 PROCEDURE — 82553 CREATINE MB FRACTION: CPT

## 2021-01-21 PROCEDURE — C9600: CPT | Mod: LD

## 2021-01-21 PROCEDURE — 86769 SARS-COV-2 COVID-19 ANTIBODY: CPT

## 2021-01-21 PROCEDURE — 71045 X-RAY EXAM CHEST 1 VIEW: CPT

## 2021-01-21 PROCEDURE — 82550 ASSAY OF CK (CPK): CPT

## 2021-01-21 PROCEDURE — C1874: CPT

## 2021-01-21 PROCEDURE — 85025 COMPLETE CBC W/AUTO DIFF WBC: CPT

## 2021-01-21 PROCEDURE — 93571 IV DOP VEL&/PRESS C FLO 1ST: CPT | Mod: LD

## 2021-01-21 PROCEDURE — U0005: CPT

## 2021-01-21 PROCEDURE — A9500: CPT

## 2021-01-21 PROCEDURE — 80053 COMPREHEN METABOLIC PANEL: CPT

## 2021-01-21 PROCEDURE — 86803 HEPATITIS C AB TEST: CPT

## 2021-01-21 PROCEDURE — U0003: CPT

## 2021-01-21 PROCEDURE — 78452 HT MUSCLE IMAGE SPECT MULT: CPT

## 2021-01-21 PROCEDURE — 93005 ELECTROCARDIOGRAM TRACING: CPT

## 2021-01-21 PROCEDURE — 99233 SBSQ HOSP IP/OBS HIGH 50: CPT

## 2021-01-21 PROCEDURE — 80061 LIPID PANEL: CPT

## 2021-01-21 PROCEDURE — 99152 MOD SED SAME PHYS/QHP 5/>YRS: CPT

## 2021-01-21 PROCEDURE — 83036 HEMOGLOBIN GLYCOSYLATED A1C: CPT

## 2021-01-21 PROCEDURE — C1887: CPT

## 2021-01-21 PROCEDURE — 93017 CV STRESS TEST TRACING ONLY: CPT

## 2021-01-21 PROCEDURE — 85610 PROTHROMBIN TIME: CPT

## 2021-01-21 PROCEDURE — 93458 L HRT ARTERY/VENTRICLE ANGIO: CPT | Mod: 59

## 2021-01-21 PROCEDURE — C1769: CPT

## 2021-01-21 PROCEDURE — 84484 ASSAY OF TROPONIN QUANT: CPT

## 2021-01-21 PROCEDURE — 85730 THROMBOPLASTIN TIME PARTIAL: CPT

## 2021-01-21 PROCEDURE — G0378: CPT

## 2021-01-21 PROCEDURE — 83880 ASSAY OF NATRIURETIC PEPTIDE: CPT

## 2021-01-21 PROCEDURE — C1894: CPT

## 2021-01-21 PROCEDURE — 99153 MOD SED SAME PHYS/QHP EA: CPT

## 2021-01-21 PROCEDURE — 99285 EMERGENCY DEPT VISIT HI MDM: CPT | Mod: 25

## 2021-01-21 RX ORDER — TICAGRELOR 90 MG/1
1 TABLET ORAL
Qty: 60 | Refills: 0
Start: 2021-01-21 | End: 2021-02-19

## 2021-01-21 RX ORDER — TICAGRELOR 90 MG/1
1 TABLET ORAL
Qty: 180 | Refills: 3
Start: 2021-01-21 | End: 2022-01-15

## 2021-01-21 RX ORDER — ACETAMINOPHEN 500 MG
2 TABLET ORAL
Qty: 0 | Refills: 0 | DISCHARGE
Start: 2021-01-21

## 2021-01-21 RX ORDER — SIMVASTATIN 20 MG/1
1 TABLET, FILM COATED ORAL
Qty: 0 | Refills: 0 | DISCHARGE

## 2021-01-21 RX ORDER — ATORVASTATIN CALCIUM 80 MG/1
1 TABLET, FILM COATED ORAL
Qty: 30 | Refills: 0
Start: 2021-01-21 | End: 2021-02-19

## 2021-01-21 RX ADMIN — TICAGRELOR 90 MILLIGRAM(S): 90 TABLET ORAL at 06:35

## 2021-01-21 RX ADMIN — PANTOPRAZOLE SODIUM 40 MILLIGRAM(S): 20 TABLET, DELAYED RELEASE ORAL at 06:34

## 2021-01-21 RX ADMIN — Medication 81 MILLIGRAM(S): at 06:37

## 2021-01-21 RX ADMIN — Medication 12.5 MILLIGRAM(S): at 06:34

## 2021-01-21 RX ADMIN — LISINOPRIL 20 MILLIGRAM(S): 2.5 TABLET ORAL at 06:36

## 2021-01-21 NOTE — DISCHARGE NOTE PROVIDER - NSDCCPCAREPLAN_GEN_ALL_CORE_FT
PRINCIPAL DISCHARGE DIAGNOSIS  Diagnosis: CAD (coronary artery disease), native coronary artery  Assessment and Plan of Treatment: Do not stop you Aspirin or Brilinta unless instructed to do so by your cardiologist, they help keep your stented arteries open.   No heavy lifting, strenuous activity, bending, straining, or unnecessary stair climbing for 2 weeks. No driving for 2 days. You may shower 24 hours following the procedure but avoid baths/swimming for 1 week. Check your groin site for bleeding and/or swelling daily following procedure and call your doctor immediately if it occurs or if you experience increased pain at the site. Follow up with your cardiologist in 1-2 weeks. You may call [facility] if you have any questions/concerns regarding your procedure [phone].      SECONDARY DISCHARGE DIAGNOSES  Diagnosis: HTN (hypertension)  Assessment and Plan of Treatment: Continue with your blood pressure medications; eat a heart healthy diet with low salt diet; exercise regularly (consult with your physician or cardiologist first); maintain a heart healthy weight; if you smoke - quit (A resource to help you stop smoking is the [facility] sabio labs for Tobacco Control – phone number [phone].); include healthy ways to manage stress. Continue to follow with your primary care physician or cardiologist.    Diagnosis: HLD (hyperlipidemia)  Assessment and Plan of Treatment: Continue with your cholesterol medications. Eat a heart healthy diet that is low in saturated fats and salt, and includes whole grains, fruits, vegetables and lean protein; exercise regularly (consult with your physician or cardiologist first); maintain a heart healthy weight; if you smoke - quit (A resource to help you stop smoking is the [facility] sabio labs for Tobacco Control – phone number [phone].). Continue to follow with your primary physician or cardiologist.

## 2021-01-21 NOTE — DISCHARGE NOTE PROVIDER - NSDCCPTREATMENT_GEN_ALL_CORE_FT
PRINCIPAL PROCEDURE  Procedure: Placement of coronary artery stent  Findings and Treatment: 2 mid LAD stents

## 2021-01-21 NOTE — DISCHARGE NOTE PROVIDER - HOSPITAL COURSE
HPI:  60 y.o F with PMHx HTN, HLD, and PSHx s/p stent presents with x2-3 weeks of chest heaviness radiating to left shoulder that worsened today. Notes pain associated with SOB, dizziness, and nausea. Reports SOB with exertion, improved at rest. States feels similar to when she needed her stent in 2009. Endorses 81 mg of aspirin. +fatigue. Denies fever, chills, cough, or recent COVID exposure.  (19 Jan 2021 22:09).    1/20 cardiac cath with 2 stents to the mid LAD. Right groin site without swelling, bleeding.

## 2021-01-21 NOTE — PROGRESS NOTE ADULT - ASSESSMENT
60 y.o F with PMHx HTN, HLD, and PSHx s/p stent presents with x2-3 weeks of chest heaviness radiating to left shoulder that worsened today. Notes pain associated with SOB, dizziness, and nausea. Reports SOB with exertion, improved at rest. States feels similar to when she needed her stent in 2009. Endorses 81mg of aspirin. +fatigue. Denies fever, chills, cough, or recent COVID exposure.    Plan:   s/p PCI x 2 into LAD, spoke to cardiology: start Brilinta, was loaded in the cath lab, change statin to Lipitor 80 mg qhs, refer to cardiac rehab. follow up w cardiology. dc planning in am
60 y.o F with PMHx HTN, HLD, and PSHx s/p stent presents with x2-3 weeks of chest heaviness radiating to left shoulder that worsened today. Notes pain associated with SOB, dizziness, and nausea. Reports SOB with exertion, improved at rest. States feels similar to when she needed her stent in 2009. Endorses 81mg of aspirin. +fatigue. Denies fever, chills, cough, or recent COVID exposure.    Plan:   s/p PCI x 2 into LAD, spoke to cardiology: start Brilinta, was loaded in the cath lab, change statin to Lipitor 80 mg qhs, refer to cardiac rehab. follow up w cardiology. dc planning home today
61 y/o F w/ PMH of HTN, HLD, CAD s/p PCI (2009) c/o CP x 2W likely 2/2 UA now s/p PCI to LAD.    #CP  -S/p LHC/PCI to LAD  -C/w ASA/ticagrelor for at least 6M  -C/w atorva 80  -C/w atenolol 50  -Refer to cardiac rehab/nutrition    #No barriers to d/c from cards perspective. Pt should have close f/u w/ her usual cardiologist, Dr. Jazmine Kramer MD  Cardiology Fellow  [phone]  All Cardiology service information can be found 24/7 on [email or web address], password: [email or web address]

## 2021-01-21 NOTE — PROGRESS NOTE ADULT - SUBJECTIVE AND OBJECTIVE BOX
Patient seen and examined at bedside.    Overnight Events: NAEO. Pt s/p successful PCI to LAD yest. This AM, denies any complaints. R groin site CDI.    Review Of Systems: No chest pain, shortness of breath, or palpitations            Current Meds:  acetaminophen   Tablet .. 650 milliGRAM(s) Oral every 6 hours PRN  aspirin enteric coated 81 milliGRAM(s) Oral daily  ATENolol  Tablet 50 milliGRAM(s) Oral daily  atorvastatin 80 milliGRAM(s) Oral at bedtime  cholecalciferol 1000 Unit(s) Oral daily  enoxaparin Injectable 40 milliGRAM(s) SubCutaneous daily  hydrochlorothiazide 12.5 milliGRAM(s) Oral daily  lisinopril 20 milliGRAM(s) Oral daily  pantoprazole    Tablet 40 milliGRAM(s) Oral before breakfast  PARoxetine 10 milliGRAM(s) Oral daily  ticagrelor 90 milliGRAM(s) Oral every 12 hours      Vitals:  T(F): 98.1 (01-21), Max: 98.4 (01-20)  HR: 57 (01-21) (51 - 61)  BP: 109/70 (01-21) (100/68 - 148/86)  RR: 16 (01-21)  SpO2: 98% (01-21)  I&O's Summary    20 Jan 2021 07:01  -  21 Jan 2021 07:00  --------------------------------------------------------  IN: 120 mL / OUT: 0 mL / NET: 120 mL    21 Jan 2021 07:01  -  21 Jan 2021 08:09  --------------------------------------------------------  IN: 240 mL / OUT: 0 mL / NET: 240 mL        Physical Exam:  Gen: NAD.  HEENT: NCAT. PERRLA b/l.  Neck: No JVP elev.  CV: Normal S1, S2. RRR. No MRG.  Chest: CTAB. No WRR.  Abd: +BSx4. Soft. NTND.  Ext: No LE edema. R groin site CDI. RLE neurovasc intact.  Skin: No cyanosis.              CARDIAC MARKERS ( 19 Jan 2021 00:10 )  <6 ng/L / x     / x     / x     / x     / x      CARDIAC MARKERS ( 18 Jan 2021 19:08 )  <6 ng/L / x     / x     / 157 U/L / x     / 2.0 ng/mL      Serum Pro-Brain Natriuretic Peptide: 81 pg/mL (01-18 @ 19:08)    Cath: < from: Cardiac Cath Lab - Adult (01.20.21 @ 08:08) >  CORONARY VESSELS: The coronary circulation is right dominant.  LM:   --  LM: Normal.  LAD:   --  Mid LAD: There was a 70 % stenosis.  CX:   --  Circumflex: Normal.  RCA:   --  Mid RCA: Angiography showed mild atherosclerosis with no flow  limiting lesions.  COMPLICATIONS: There were no complications.  INTERVENTIONAL RECOMMENDATIONS: DAPT for 6 mths  
Patient is a 60y old  Female who presents with a chief complaint of UA (21 Jan 2021 08:09)      SUBJECTIVE / OVERNIGHT EVENTS: s/p PCI x 2 into LAD, spoke to cardiology: start Brilinta was loaded in the cath lab, change statin to Lipitor 80 mg qhs, refer to cardiac rehab    MEDICATIONS  (STANDING):  aspirin enteric coated 81 milliGRAM(s) Oral daily  ATENolol  Tablet 50 milliGRAM(s) Oral daily  atorvastatin 80 milliGRAM(s) Oral at bedtime  cholecalciferol 1000 Unit(s) Oral daily  enoxaparin Injectable 40 milliGRAM(s) SubCutaneous daily  hydrochlorothiazide 12.5 milliGRAM(s) Oral daily  lisinopril 20 milliGRAM(s) Oral daily  pantoprazole    Tablet 40 milliGRAM(s) Oral before breakfast  PARoxetine 10 milliGRAM(s) Oral daily  ticagrelor 90 milliGRAM(s) Oral every 12 hours    MEDICATIONS  (PRN):  acetaminophen   Tablet .. 650 milliGRAM(s) Oral every 6 hours PRN Mild Pain (1 - 3)      Vital Signs Last 24 Hrs  T(F): 98.2 (01-20-21 @ 16:51)  HR: 64 (01-21-20 @ 16:51)  BP: 146/83 (01-20-21 @ 16:51)   RR: 16 (01-20-21 @ 16:51)   SpO2: 98% (01-20-21 @ 16:51)   Telemetry:   CAPILLARY BLOOD GLUCOSE      PHYSICAL EXAM:  GENERAL: NAD, well-developed  HEAD:  Atraumatic, Normocephalic  EYES: EOMI, PERRLA, conjunctiva and sclera clear  NECK: Supple, No JVD  CHEST/LUNG: Clear to auscultation bilaterally; No wheeze  HEART: Regular rate and rhythm; No murmurs, rubs, or gallops  ABDOMEN: Soft, Nontender, Nondistended; Bowel sounds present  EXTREMITIES:  2+ Peripheral Pulses, No clubbing, cyanosis, or edema  PSYCH: AAOx3  NEUROLOGY: non-focal  SKIN: No rashes or lesions      
Patient is a 60y old  Female who presents with a chief complaint of UA (21 Jan 2021 08:09)      SUBJECTIVE / OVERNIGHT EVENTS: pain free, smiling, cleared by cardiology for DC home    MEDICATIONS  (STANDING):  aspirin enteric coated 81 milliGRAM(s) Oral daily  ATENolol  Tablet 50 milliGRAM(s) Oral daily  atorvastatin 80 milliGRAM(s) Oral at bedtime  cholecalciferol 1000 Unit(s) Oral daily  enoxaparin Injectable 40 milliGRAM(s) SubCutaneous daily  hydrochlorothiazide 12.5 milliGRAM(s) Oral daily  lisinopril 20 milliGRAM(s) Oral daily  pantoprazole    Tablet 40 milliGRAM(s) Oral before breakfast  PARoxetine 10 milliGRAM(s) Oral daily  ticagrelor 90 milliGRAM(s) Oral every 12 hours    MEDICATIONS  (PRN):  acetaminophen   Tablet .. 650 milliGRAM(s) Oral every 6 hours PRN Mild Pain (1 - 3)      Vital Signs Last 24 Hrs  T(F): 98.2 (01-21-21 @ 09:51), Max: 98.2 (01-21-21 @ 09:51)  HR: 62 (01-21-21 @ 09:51) (57 - 64)  BP: 136/83 (01-21-21 @ 09:51) (109/70 - 148/86)  RR: 16 (01-21-21 @ 09:51) (16 - 18)  SpO2: 98% (01-21-21 @ 09:51) (97% - 100%)  Telemetry:   CAPILLARY BLOOD GLUCOSE        I&O's Summary    20 Jan 2021 07:01  -  21 Jan 2021 07:00  --------------------------------------------------------  IN: 120 mL / OUT: 0 mL / NET: 120 mL    21 Jan 2021 07:01  -  21 Jan 2021 13:37  --------------------------------------------------------  IN: 480 mL / OUT: 0 mL / NET: 480 mL        PHYSICAL EXAM:  GENERAL: NAD, well-developed  HEAD:  Atraumatic, Normocephalic  EYES: EOMI, PERRLA, conjunctiva and sclera clear  NECK: Supple, No JVD  CHEST/LUNG: Clear to auscultation bilaterally; No wheeze  HEART: Regular rate and rhythm; No murmurs, rubs, or gallops  ABDOMEN: Soft, Nontender, Nondistended; Bowel sounds present  EXTREMITIES:  2+ Peripheral Pulses, No clubbing, cyanosis, or edema  PSYCH: AAOx3  NEUROLOGY: non-focal  SKIN: No rashes or lesions    LABS:                    RADIOLOGY & ADDITIONAL TESTS:    Imaging Personally Reviewed:    Consultant(s) Notes Reviewed:      Care Discussed with Consultants/Other Providers:

## 2021-01-21 NOTE — DISCHARGE NOTE PROVIDER - NSDCMRMEDTOKEN_GEN_ALL_CORE_FT
acetaminophen 325 mg oral tablet: 2 tab(s) orally every 6 hours, As needed, Mild Pain (1 - 3)  Artificial Tears ophthalmic solution: 1 drop(s) in each eye 2 times a day  aspirin 81 mg oral delayed release tablet: 1 tab(s) orally once a day  atenolol 50 mg oral tablet: 1 tab(s) orally once a day  atorvastatin 80 mg oral tablet: 1 tab(s) orally once a day (at bedtime)  azelastine 137 mcg/inh (0.1%) nasal spray: 2 spray(s) in each nostril 2 times a day  Brilinta (ticagrelor) 90 mg oral tablet: 1 tab(s) orally 2 times a day   cetirizine 10 mg oral tablet: 1 tab(s) orally once a day (at bedtime)  lisinopril-hydrochlorothiazide 20 mg-12.5 mg oral tablet: 1 tab(s) orally once a day  mometasone 50 mcg/inh nasal spray: 2 spray(s) in each nostril once a day  omeprazole 40 mg oral delayed release capsule: 1 cap(s) orally once a day  Paxil 10 mg oral tablet: 1 tab(s) orally once a day (in the evening)  Vitamin D3 1000 intl units (25 mcg) oral tablet: 1 tab(s) orally once a day   acetaminophen 325 mg oral tablet: 2 tab(s) orally every 6 hours, As needed, Mild Pain (1 - 3)  Artificial Tears ophthalmic solution: 1 drop(s) in each eye 2 times a day  aspirin 81 mg oral delayed release tablet: 1 tab(s) orally once a day  atenolol 50 mg oral tablet: 1 tab(s) orally once a day  atorvastatin 80 mg oral tablet: 1 tab(s) orally once a day (at bedtime)  azelastine 137 mcg/inh (0.1%) nasal spray: 2 spray(s) in each nostril 2 times a day  cetirizine 10 mg oral tablet: 1 tab(s) orally once a day (at bedtime)  lisinopril-hydrochlorothiazide 20 mg-12.5 mg oral tablet: 1 tab(s) orally once a day  mometasone 50 mcg/inh nasal spray: 2 spray(s) in each nostril once a day  omeprazole 40 mg oral delayed release capsule: 1 cap(s) orally once a day  Paxil 10 mg oral tablet: 1 tab(s) orally once a day (in the evening)  ticagrelor 90 mg oral tablet: 1 tab(s) orally every 12 hours  ticagrelor 90 mg oral tablet: 1 tab(s) orally every 12 hours  Vitamin D3 1000 intl units (25 mcg) oral tablet: 1 tab(s) orally once a day

## 2021-01-21 NOTE — DISCHARGE NOTE PROVIDER - CARE PROVIDER_API CALL
Garfield Torres  CARDIOLOGY  [address] [place] [address], [address] [place] [address]  Phone: [phone]  Fax: [phone]  Follow Up Time: 2 weeks

## 2021-01-21 NOTE — DISCHARGE NOTE PROVIDER - NSDCFUSCHEDAPPT_GEN_ALL_CORE_FT
ELIE CASILLAS ; 01/25/2021 ; NPP Cardio [address] ELIE aMdera ; 03/17/2021 ; NPP OtoLaryng [address] [place] [address]

## 2021-01-26 RX ORDER — ROSUVASTATIN CALCIUM 20 MG/1
20 TABLET, FILM COATED ORAL
Qty: 90 | Refills: 3 | Status: ACTIVE | COMMUNITY
Start: 1900-01-01 | End: 1900-01-01

## 2021-01-30 ENCOUNTER — EMERGENCY (EMERGENCY)
Facility: HOSPITAL | Age: 61
LOS: 1 days | Discharge: HOME | End: 2021-01-30
Attending: EMERGENCY MEDICINE
Payer: COMMERCIAL

## 2021-01-30 ENCOUNTER — TRANSCRIPTION ENCOUNTER (OUTPATIENT)
Age: 61
End: 2021-01-30

## 2021-01-30 VITALS
WEIGHT: 134.92 LBS | DIASTOLIC BLOOD PRESSURE: 57 MMHG | OXYGEN SATURATION: 95 % | SYSTOLIC BLOOD PRESSURE: 100 MMHG | HEIGHT: 61 IN | HEART RATE: 81 BPM | TEMPERATURE: 101 F | RESPIRATION RATE: 20 BRPM

## 2021-01-30 VITALS
HEART RATE: 76 BPM | OXYGEN SATURATION: 97 % | RESPIRATION RATE: 16 BRPM | SYSTOLIC BLOOD PRESSURE: 107 MMHG | DIASTOLIC BLOOD PRESSURE: 73 MMHG | TEMPERATURE: 99 F

## 2021-01-30 DIAGNOSIS — Z95.5 PRESENCE OF CORONARY ANGIOPLASTY IMPLANT AND GRAFT: Chronic | ICD-10-CM

## 2021-01-30 DIAGNOSIS — Z98.890 OTHER SPECIFIED POSTPROCEDURAL STATES: Chronic | ICD-10-CM

## 2021-01-30 LAB
ALBUMIN SERPL ELPH-MCNC: 3.9 G/DL — SIGNIFICANT CHANGE UP (ref 3.3–5)
ALP SERPL-CCNC: 51 U/L — SIGNIFICANT CHANGE UP (ref 40–120)
ALT FLD-CCNC: 34 U/L — SIGNIFICANT CHANGE UP (ref 10–45)
ANION GAP SERPL CALC-SCNC: 11 MMOL/L — SIGNIFICANT CHANGE UP (ref 5–17)
AST SERPL-CCNC: 41 U/L — HIGH (ref 10–40)
BASOPHILS # BLD AUTO: 0.03 K/UL — SIGNIFICANT CHANGE UP (ref 0–0.2)
BASOPHILS NFR BLD AUTO: 0.4 % — SIGNIFICANT CHANGE UP (ref 0–2)
BILIRUB SERPL-MCNC: 0.5 MG/DL — SIGNIFICANT CHANGE UP (ref 0.2–1.2)
BUN SERPL-MCNC: 23 MG/DL — SIGNIFICANT CHANGE UP (ref 7–23)
CALCIUM SERPL-MCNC: 9.2 MG/DL — SIGNIFICANT CHANGE UP (ref 8.4–10.5)
CHLORIDE SERPL-SCNC: 92 MMOL/L — LOW (ref 96–108)
CO2 SERPL-SCNC: 24 MMOL/L — SIGNIFICANT CHANGE UP (ref 22–31)
CREAT SERPL-MCNC: 1.01 MG/DL — SIGNIFICANT CHANGE UP (ref 0.5–1.3)
EOSINOPHIL # BLD AUTO: 0.01 K/UL — SIGNIFICANT CHANGE UP (ref 0–0.5)
EOSINOPHIL NFR BLD AUTO: 0.1 % — SIGNIFICANT CHANGE UP (ref 0–6)
GLUCOSE SERPL-MCNC: 115 MG/DL — HIGH (ref 70–99)
HCT VFR BLD CALC: 38.6 % — SIGNIFICANT CHANGE UP (ref 34.5–45)
HGB BLD-MCNC: 12.9 G/DL — SIGNIFICANT CHANGE UP (ref 11.5–15.5)
IMM GRANULOCYTES NFR BLD AUTO: 0.6 % — SIGNIFICANT CHANGE UP (ref 0–1.5)
LYMPHOCYTES # BLD AUTO: 1.67 K/UL — SIGNIFICANT CHANGE UP (ref 1–3.3)
LYMPHOCYTES # BLD AUTO: 24.2 % — SIGNIFICANT CHANGE UP (ref 13–44)
MCHC RBC-ENTMCNC: 28.6 PG — SIGNIFICANT CHANGE UP (ref 27–34)
MCHC RBC-ENTMCNC: 33.4 GM/DL — SIGNIFICANT CHANGE UP (ref 32–36)
MCV RBC AUTO: 85.6 FL — SIGNIFICANT CHANGE UP (ref 80–100)
MONOCYTES # BLD AUTO: 0.68 K/UL — SIGNIFICANT CHANGE UP (ref 0–0.9)
MONOCYTES NFR BLD AUTO: 9.8 % — SIGNIFICANT CHANGE UP (ref 2–14)
NEUTROPHILS # BLD AUTO: 4.48 K/UL — SIGNIFICANT CHANGE UP (ref 1.8–7.4)
NEUTROPHILS NFR BLD AUTO: 64.9 % — SIGNIFICANT CHANGE UP (ref 43–77)
NRBC # BLD: 0 /100 WBCS — SIGNIFICANT CHANGE UP (ref 0–0)
PLATELET # BLD AUTO: 248 K/UL — SIGNIFICANT CHANGE UP (ref 150–400)
POTASSIUM SERPL-MCNC: 4 MMOL/L — SIGNIFICANT CHANGE UP (ref 3.5–5.3)
POTASSIUM SERPL-SCNC: 4 MMOL/L — SIGNIFICANT CHANGE UP (ref 3.5–5.3)
PROT SERPL-MCNC: 6.9 G/DL — SIGNIFICANT CHANGE UP (ref 6–8.3)
RBC # BLD: 4.51 M/UL — SIGNIFICANT CHANGE UP (ref 3.8–5.2)
RBC # FLD: 12.7 % — SIGNIFICANT CHANGE UP (ref 10.3–14.5)
SARS-COV-2 RNA SPEC QL NAA+PROBE: SIGNIFICANT CHANGE UP
SODIUM SERPL-SCNC: 127 MMOL/L — LOW (ref 135–145)
WBC # BLD: 6.91 K/UL — SIGNIFICANT CHANGE UP (ref 3.8–10.5)
WBC # FLD AUTO: 6.91 K/UL — SIGNIFICANT CHANGE UP (ref 3.8–10.5)

## 2021-01-30 PROCEDURE — 71045 X-RAY EXAM CHEST 1 VIEW: CPT

## 2021-01-30 PROCEDURE — U0005: CPT

## 2021-01-30 PROCEDURE — 99284 EMERGENCY DEPT VISIT MOD MDM: CPT | Mod: 25

## 2021-01-30 PROCEDURE — 80053 COMPREHEN METABOLIC PANEL: CPT

## 2021-01-30 PROCEDURE — 85025 COMPLETE CBC W/AUTO DIFF WBC: CPT

## 2021-01-30 PROCEDURE — 71045 X-RAY EXAM CHEST 1 VIEW: CPT | Mod: 26

## 2021-01-30 PROCEDURE — U0003: CPT

## 2021-01-30 PROCEDURE — 99284 EMERGENCY DEPT VISIT MOD MDM: CPT

## 2021-01-30 RX ORDER — DIPHENHYDRAMINE HCL 50 MG
25 CAPSULE ORAL ONCE
Refills: 0 | Status: COMPLETED | OUTPATIENT
Start: 2021-01-30 | End: 2021-01-30

## 2021-01-30 RX ADMIN — Medication 25 MILLIGRAM(S): at 17:23

## 2021-01-30 NOTE — ED PROVIDER NOTE - PHYSICAL EXAMINATION
Gen: AAOx3, non-toxic  Head: NCAT  HEENT: EOMI, oral mucosa moist, normal conjunctiva  Lung: CTAB, no respiratory distress, no wheezes/rhonchi/rales B/L, speaking in full sentences  CV: RRR, no murmurs, rubs or gallops  Abd: soft, NTND, no guarding, no CVA tenderness  MSK: no visible deformities  Neuro: No focal sensory or motor deficits, normal CN exam   Skin: Warm, well perfused, no rash  Psych: normal affect.     Dino Cantu PGY3

## 2021-01-30 NOTE — ED ADULT NURSE NOTE - BEFAST SCREENING
Na left word for him to contact the office pb      ----- Message from Nirav Baldwin MD sent at 9/28/2020  1:35 PM CDT -----  See comments below and call patient to discuss.   Please close encounter when done -- no need to route back to me.  Thanks  Except for BS, all is OK       Negative

## 2021-01-30 NOTE — ED PROVIDER NOTE - ATTENDING CONTRIBUTION TO CARE
60F, pmh htn, hld, cad s/p stent 1 week ago, presents with fatigue, non-productive cough x 1 week. Feels generally tired. Denies any cp, sob, palpitations. Denies lightheadedness, dizziness. No abd pain, n/v/d, dark or bloody stools. No urinary symptoms. No known sick contacts. No calf pain or swelling. Denies pain, swelling, discoloration at cath insertion site (R fem), or coldness or discomfort to leg.    PE: NAD, NCAT, MMM, Trachea midline, Normal conjunctiva, lungs CTAB, S1/S2 RRR, Normal perfusion, 2+ radial pulses bilat, Abdomen Soft, NTND, No rebound/guarding, No LE edema, LE warm, well perfused, No deformity of extremities, No rashes,  No focal motor or sensory deficits.     Pt well appearing. Pt is febrile. Most concerning for viral illness, damian consider covid-19 given prevalence. Check swab. Check CBC eval for anemia, cmp eval for metabolic derangement. CXR eval for consolidation. Does not suggest acute cardiac pathology at this time, PE, post-cath complication. Re-eval. - Will Aguilera MD

## 2021-01-30 NOTE — ED ADULT NURSE NOTE - NSIMPLEMENTINTERV_GEN_ALL_ED
Implemented All Fall with Harm Risk Interventions:  [place] to call system. Call bell, personal items and telephone within reach. Instruct patient to call for assistance. Room bathroom lighting operational. Non-slip footwear when patient is off stretcher. Physically safe environment: no spills, clutter or unnecessary equipment. Stretcher in lowest position, wheels locked, appropriate side rails in place. Provide visual cue, wrist band, yellow gown, etc. Monitor gait and stability. Monitor for mental status changes and reorient to person, place, and time. Review medications for side effects contributing to fall risk. Reinforce activity limits and safety measures with patient and family. Provide visual clues: red socks.

## 2021-01-30 NOTE — ED PROVIDER NOTE - PATIENT PORTAL LINK FT
You can access the FollowMyHealth Patient Portal offered by [facility] by registering at the following website: [email or web address]. By joining [facility]’s FollowMyHealth portal, you will also be able to view your health information using other applications (apps) compatible with our system.

## 2021-01-30 NOTE — ED PROVIDER NOTE - SHIFT CHANGE DETAILS
Pt was signed out to Dr. Leo at this time pending labwork, cxr, and re-eval for dispo. - Will Aguilera MD

## 2021-01-30 NOTE — ED PROVIDER NOTE - NSFOLLOWUPINSTRUCTIONS_ED_ALL_ED_FT
Follow up with your PCP in 24-48 hours.   Quarantine until your COVID results come back.   May take Tylenol as directed on the bottle for pain control.   Return to the ER if you develop any new or worsening symptoms such as chest pain, shortness of breath, numbness, weakness, abdominal pain, nausea, vomiting, or visual changes.

## 2021-01-30 NOTE — ED PROVIDER NOTE - OBJECTIVE STATEMENT
60F with PMH including HTN, HLD, and CAD s/p stent last week presents to the ER with fatigue, lack of appetite, and dry cough x 1 week. Denies any other symptoms including fever, HA, chest pain, SOB, abd pain, N/V/D, urinary symptoms, numbness, or focal weakness. No known sick contacts or travel history.

## 2021-01-30 NOTE — ED PROVIDER NOTE - NS ED ROS FT
ROS:  GENERAL: +fatigue. No fever, no chills  EYES: no change in vision  HEENT: no trouble swallowing, no trouble speaking  CARDIAC: no chest pain  PULMONARY: +cough, no shortness of breath  GI: +lack of appetite. no abdominal pain, no nausea, no vomiting, no diarrhea, no constipation  : No dysuria, no frequency, no change in appearance, or odor of urine  SKIN: no rashes  NEURO: no headache, no weakness  MSK: No joint pain    Dino Cantu PGY3

## 2021-01-30 NOTE — ED ADULT NURSE NOTE - OBJECTIVE STATEMENT
Pt is a 59 yo F who came to the ED amb c/o fatigue and weakness for a  few days. States had 2 cardiac stents placed 1 week ago.  + chills, no fever, no cp/sob/palpitations. States she has a dry cough since she started taking a beta blocker. Also c/o rash to inner arms that she noticed today. Redness noticed to inner elbow/arms, dry, non itchy. A/O x3.

## 2021-01-30 NOTE — ED ADULT TRIAGE NOTE - CHIEF COMPLAINT QUOTE
1 week post-op for cardiac stent. Complains of weakness and rash on bilateral forearms (not itchy), fever. Patient on beta-blocker.

## 2021-01-30 NOTE — ED PROVIDER NOTE - CLINICAL SUMMARY MEDICAL DECISION MAKING FREE TEXT BOX
Fatigue, cough, lack of appetite x 1 week. Started shortly after being discharged after a cardiac stent. Denies any chest pain, SOB, diaphoresis, N/V/D, fever, abd pain. Pt well appearing, HD stable, unremarkable exam. Low suspicion for atypical ACS based on exam/history. Will assess for COVID/PNA & lab abnormalities then determine need for further evaluation.

## 2021-01-30 NOTE — ED PROVIDER NOTE - DISPOSITION TYPE
49y/o Thai speaking F presented with fevers and abdominal pain found to have b/l tubo-ovarian abscesses and right ovarian vein thrombosis on CT abd/pelvis. Pt now s/p LISA, b/l salpingo-oopherectomy currently POD #5 placed on prophylactic lovenox course complicated by ESBL+ in abdominal wound cultures. Hematology consulted for management and duration of treatment. 51y/o Italian speaking F presented with fevers and abdominal pain found to have b/l tubo-ovarian abscesses and right ovarian vein thrombosis on CT abd/pelvis. Pt now s/p LISA, b/l salpingo-oopherectomy currently POD #5 placed on prophylactic lovenox course complicated by ESBL+ in abdominal wound cultures. Hematology consulted for management and duration of treatment.     plan to follow 51y/o Malagasy speaking F presented with fevers and abdominal pain found to have b/l tubo-ovarian abscesses and right ovarian vein thrombosis on CT abd/pelvis. Pt now s/p LISA, b/l salpingo-oopherectomy currently POD #5 placed on prophylactic lovenox course complicated by ESBL+ in abdominal wound cultures. Hematology consulted for management and duration of treatment.     #Right ovarian vein thrombosis (OVT)- Treatment for an OVT in the setting of acute infection (thrombophlebitis) is controversial and under debate if patients indeed need to be treated with therapeutic anticoagulation. Currently in the literature, the general consensus is that an OVT can resolve spontaneously, and anticoagulation could be considered in patients whose thrombus extends into renal veins or the IVC (Journal of Blood Advances, Ovarian vein thrombus: to treat or not to treat? 2017 Jun 27; 1(15): 1120–1123). The appropriate length of anticoagulation in patients diagnosed with OVT is still under investigation as well.  We currently do not recommend therapeutic anticoagulation for this patient as her clot does not have any high risk features. She can continue with prophylactic dose of lovenox until her discharge; hypercoagulable workup and followup is not necessary in this patient. She should continue antibiotics, and we have explained that when she completes her course of abx, and has properly recovered from her infection, it is reasonable for her to have an abdominal ultrasound to evaluate resolution of the clot when she returns to her [place] and follows up with her physician there.     Joselin Holden MD  Hematology Oncology Fellow, PGY-4  [facility] Pager: [phone]/ [facility] Pager: [phone] DISCHARGE

## 2021-02-02 ENCOUNTER — TRANSCRIPTION ENCOUNTER (OUTPATIENT)
Age: 61
End: 2021-02-02

## 2021-02-18 RX ORDER — TICAGRELOR 90 MG/1
1 TABLET ORAL
Qty: 180 | Refills: 3
Start: 2021-02-18 | End: 2022-02-12

## 2021-03-08 ENCOUNTER — APPOINTMENT (OUTPATIENT)
Dept: CARDIOLOGY | Facility: CLINIC | Age: 61
End: 2021-03-08
Payer: COMMERCIAL

## 2021-03-08 ENCOUNTER — NON-APPOINTMENT (OUTPATIENT)
Age: 61
End: 2021-03-08

## 2021-03-08 DIAGNOSIS — E78.5 HYPERLIPIDEMIA, UNSPECIFIED: ICD-10-CM

## 2021-03-08 DIAGNOSIS — I10 ESSENTIAL (PRIMARY) HYPERTENSION: ICD-10-CM

## 2021-03-08 DIAGNOSIS — I25.10 ATHEROSCLEROTIC HEART DISEASE OF NATIVE CORONARY ARTERY W/OUT ANGINA PECTORIS: ICD-10-CM

## 2021-03-08 DIAGNOSIS — R07.89 OTHER CHEST PAIN: ICD-10-CM

## 2021-03-08 PROCEDURE — 93000 ELECTROCARDIOGRAM COMPLETE: CPT

## 2021-03-08 PROCEDURE — 99072 ADDL SUPL MATRL&STAF TM PHE: CPT

## 2021-03-08 PROCEDURE — 99214 OFFICE O/P EST MOD 30 MIN: CPT

## 2021-03-08 RX ORDER — AMLODIPINE BESYLATE 5 MG/1
5 TABLET ORAL DAILY
Refills: 1 | Status: ACTIVE | COMMUNITY
Start: 2021-03-08

## 2021-03-08 RX ORDER — LISINOPRIL AND HYDROCHLOROTHIAZIDE TABLETS 20; 12.5 MG/1; MG/1
20-12.5 TABLET ORAL DAILY
Refills: 5 | Status: DISCONTINUED | COMMUNITY
End: 2021-03-08

## 2021-03-17 ENCOUNTER — APPOINTMENT (OUTPATIENT)
Dept: OTOLARYNGOLOGY | Facility: CLINIC | Age: 61
End: 2021-03-17

## 2021-03-27 PROBLEM — R07.89 CHEST TIGHTNESS OR PRESSURE: Status: ACTIVE | Noted: 2019-01-28

## 2021-03-27 NOTE — DISCUSSION/SUMMARY
[FreeTextEntry1] : HTN-better controlled\par \par CAD- cath with patent vessles.  Sx improved\par \par Lipids- stable with LDL goal < 70\par \par Followup in 3 mths\par \par Cont MEDS

## 2021-09-13 ENCOUNTER — APPOINTMENT (OUTPATIENT)
Dept: CARDIOLOGY | Facility: CLINIC | Age: 61
End: 2021-09-13

## 2021-09-22 ENCOUNTER — APPOINTMENT (OUTPATIENT)
Dept: RADIOLOGY | Facility: CLINIC | Age: 61
End: 2021-09-22
Payer: COMMERCIAL

## 2021-09-22 ENCOUNTER — APPOINTMENT (OUTPATIENT)
Dept: ULTRASOUND IMAGING | Facility: CLINIC | Age: 61
End: 2021-09-22
Payer: COMMERCIAL

## 2021-09-22 ENCOUNTER — APPOINTMENT (OUTPATIENT)
Dept: MAMMOGRAPHY | Facility: CLINIC | Age: 61
End: 2021-09-22
Payer: COMMERCIAL

## 2021-09-22 ENCOUNTER — OUTPATIENT (OUTPATIENT)
Dept: OUTPATIENT SERVICES | Facility: HOSPITAL | Age: 61
LOS: 1 days | End: 2021-09-22
Payer: COMMERCIAL

## 2021-09-22 DIAGNOSIS — Z01.419 ENCOUNTER FOR GYNECOLOGICAL EXAMINATION (GENERAL) (ROUTINE) WITHOUT ABNORMAL FINDINGS: ICD-10-CM

## 2021-09-22 DIAGNOSIS — Z98.890 OTHER SPECIFIED POSTPROCEDURAL STATES: Chronic | ICD-10-CM

## 2021-09-22 DIAGNOSIS — Z95.5 PRESENCE OF CORONARY ANGIOPLASTY IMPLANT AND GRAFT: Chronic | ICD-10-CM

## 2021-09-22 DIAGNOSIS — Z00.8 ENCOUNTER FOR OTHER GENERAL EXAMINATION: ICD-10-CM

## 2021-09-22 PROCEDURE — 76641 ULTRASOUND BREAST COMPLETE: CPT | Mod: 26,50

## 2021-09-22 PROCEDURE — 76641 ULTRASOUND BREAST COMPLETE: CPT

## 2021-09-22 PROCEDURE — 77063 BREAST TOMOSYNTHESIS BI: CPT

## 2021-09-22 PROCEDURE — 77080 DXA BONE DENSITY AXIAL: CPT

## 2021-09-22 PROCEDURE — 77067 SCR MAMMO BI INCL CAD: CPT | Mod: 26

## 2021-09-22 PROCEDURE — 77063 BREAST TOMOSYNTHESIS BI: CPT | Mod: 26

## 2021-09-22 PROCEDURE — 77067 SCR MAMMO BI INCL CAD: CPT

## 2021-09-22 PROCEDURE — 77080 DXA BONE DENSITY AXIAL: CPT | Mod: 26

## 2021-09-27 ENCOUNTER — RX RENEWAL (OUTPATIENT)
Age: 61
End: 2021-09-27

## 2022-03-25 ENCOUNTER — RX RENEWAL (OUTPATIENT)
Age: 62
End: 2022-03-25

## 2022-04-04 ENCOUNTER — RX RENEWAL (OUTPATIENT)
Age: 62
End: 2022-04-04

## 2022-04-04 RX ORDER — CLOPIDOGREL BISULFATE 75 MG/1
75 TABLET, FILM COATED ORAL DAILY
Qty: 30 | Refills: 10 | Status: ACTIVE | COMMUNITY
Start: 2021-02-26 | End: 1900-01-01

## 2022-09-23 ENCOUNTER — APPOINTMENT (OUTPATIENT)
Dept: ULTRASOUND IMAGING | Facility: IMAGING CENTER | Age: 62
End: 2022-09-23

## 2022-09-23 ENCOUNTER — OUTPATIENT (OUTPATIENT)
Dept: OUTPATIENT SERVICES | Facility: HOSPITAL | Age: 62
LOS: 1 days | End: 2022-09-23
Payer: COMMERCIAL

## 2022-09-23 ENCOUNTER — APPOINTMENT (OUTPATIENT)
Dept: MAMMOGRAPHY | Facility: IMAGING CENTER | Age: 62
End: 2022-09-23

## 2022-09-23 DIAGNOSIS — Z00.8 ENCOUNTER FOR OTHER GENERAL EXAMINATION: ICD-10-CM

## 2022-09-23 DIAGNOSIS — Z95.5 PRESENCE OF CORONARY ANGIOPLASTY IMPLANT AND GRAFT: Chronic | ICD-10-CM

## 2022-09-23 DIAGNOSIS — Z98.890 OTHER SPECIFIED POSTPROCEDURAL STATES: Chronic | ICD-10-CM

## 2022-09-23 PROCEDURE — 77067 SCR MAMMO BI INCL CAD: CPT | Mod: 26

## 2022-09-23 PROCEDURE — 76641 ULTRASOUND BREAST COMPLETE: CPT

## 2022-09-23 PROCEDURE — 76641 ULTRASOUND BREAST COMPLETE: CPT | Mod: 26,50

## 2022-09-23 PROCEDURE — 77063 BREAST TOMOSYNTHESIS BI: CPT | Mod: 26

## 2022-09-23 PROCEDURE — 77063 BREAST TOMOSYNTHESIS BI: CPT

## 2022-09-23 PROCEDURE — 77067 SCR MAMMO BI INCL CAD: CPT

## 2023-01-01 NOTE — ED CDU PROVIDER INITIAL DAY NOTE - PMH
Anxiety Disorder    CAD (Coronary Artery Disease)    Coronary Stent  to RCA 6/19/09  Deviated septum    GERD (Gastroesophageal Reflux    Hypercholesterolemia    Hypertension    BOLA on CPAP  refuses to use bipap  
n/a

## 2023-01-15 NOTE — ED ADULT TRIAGE NOTE - WEIGHT IN KG
72-year-old female with history of hypothyroidism, hyperlipidemia, AF on Xarelto, thrombocytosis sent in for worsening shortness of breath and CHF.   Reports a 40 lb weight gain, though has lost almost 25 lbs since increase in her home diuretic regimen.    - was sent from Dr. Fernandez's office w/ c/o SOB, orthopnea and edema, likely multifactorial d/t CHF exac, +covid   - s/p IV lasix with improvement in volume status   - ok to give lasix holiday today, reasses in am, continue to trend renal indices  - CXR with opacities more prominent in R side   - CT chest noted, had mosaic pattern and nodules in the past   - monitor daily weights and i/o's  - Echo (2022)  normal LV function and moderate MR  - f/u TTE     - mild troponin leak, now downtrending , no suggestion of acs, no anginal complaints   - cath without cad in 2018    - known hx of pAfib  - BP, HR stable and controlled per flowsheet   - cont BB, AC  - Monitor and replete Lytes. Keep K > 4 and Mg > 2  - continue to monitor routine hemodynamics    - +Covid/ CDIFF isolation, management and supportive care per primary   - Will continue to follow.    Sharri Steward, [facility]  Nurse Practitioner - Cardiology   Spectra #[phone]/ [phone]       61.2

## 2023-04-26 NOTE — ED ADULT NURSE NOTE - NURSING NEURO ORIENTATION
----- Message from Ashli Thomas MD sent at 4/26/2023  4:48 PM CDT -----  Stable breast us follow up in 1 sumeet  
oriented to person, place and time

## 2023-10-03 ENCOUNTER — APPOINTMENT (OUTPATIENT)
Dept: RADIOLOGY | Facility: IMAGING CENTER | Age: 63
End: 2023-10-03
Payer: COMMERCIAL

## 2023-10-03 ENCOUNTER — APPOINTMENT (OUTPATIENT)
Dept: ULTRASOUND IMAGING | Facility: IMAGING CENTER | Age: 63
End: 2023-10-03
Payer: COMMERCIAL

## 2023-10-03 ENCOUNTER — APPOINTMENT (OUTPATIENT)
Dept: MAMMOGRAPHY | Facility: IMAGING CENTER | Age: 63
End: 2023-10-03
Payer: COMMERCIAL

## 2023-10-03 ENCOUNTER — OUTPATIENT (OUTPATIENT)
Dept: OUTPATIENT SERVICES | Facility: HOSPITAL | Age: 63
LOS: 1 days | End: 2023-10-03
Payer: COMMERCIAL

## 2023-10-03 DIAGNOSIS — Z00.8 ENCOUNTER FOR OTHER GENERAL EXAMINATION: ICD-10-CM

## 2023-10-03 DIAGNOSIS — Z98.890 OTHER SPECIFIED POSTPROCEDURAL STATES: Chronic | ICD-10-CM

## 2023-10-03 DIAGNOSIS — Z95.5 PRESENCE OF CORONARY ANGIOPLASTY IMPLANT AND GRAFT: Chronic | ICD-10-CM

## 2023-10-03 PROCEDURE — 77067 SCR MAMMO BI INCL CAD: CPT | Mod: 26

## 2023-10-03 PROCEDURE — 76641 ULTRASOUND BREAST COMPLETE: CPT | Mod: 26,50

## 2023-10-03 PROCEDURE — 77080 DXA BONE DENSITY AXIAL: CPT | Mod: 26

## 2023-10-03 PROCEDURE — 77080 DXA BONE DENSITY AXIAL: CPT

## 2023-10-03 PROCEDURE — 77063 BREAST TOMOSYNTHESIS BI: CPT | Mod: 26

## 2023-10-03 PROCEDURE — 77067 SCR MAMMO BI INCL CAD: CPT

## 2023-10-03 PROCEDURE — 76641 ULTRASOUND BREAST COMPLETE: CPT

## 2023-10-03 PROCEDURE — 77063 BREAST TOMOSYNTHESIS BI: CPT

## 2024-10-03 ENCOUNTER — APPOINTMENT (OUTPATIENT)
Dept: ORTHOPEDIC SURGERY | Facility: CLINIC | Age: 64
End: 2024-10-03
Payer: COMMERCIAL

## 2024-10-03 VITALS — BODY MASS INDEX: 25.68 KG/M2 | HEIGHT: 61 IN | WEIGHT: 136 LBS

## 2024-10-03 DIAGNOSIS — M17.12 UNILATERAL PRIMARY OSTEOARTHRITIS, LEFT KNEE: ICD-10-CM

## 2024-10-03 DIAGNOSIS — M17.11 UNILATERAL PRIMARY OSTEOARTHRITIS, RIGHT KNEE: ICD-10-CM

## 2024-10-03 PROCEDURE — 73564 X-RAY EXAM KNEE 4 OR MORE: CPT | Mod: 50

## 2024-10-03 PROCEDURE — 99203 OFFICE O/P NEW LOW 30 MIN: CPT

## 2024-10-03 RX ORDER — MELOXICAM 15 MG/1
15 TABLET ORAL DAILY
Qty: 10 | Refills: 0 | Status: ACTIVE | COMMUNITY
Start: 2024-10-03 | End: 1900-01-01

## 2024-10-03 NOTE — HISTORY OF PRESENT ILLNESS
[10] : 10 [8] : 8 [Dull/Aching] : dull/aching [Sharp] : sharp [Tightness] : tightness [Constant] : constant [Leisure] : leisure [Sleep] : sleep [Rest] : rest [Meds] : meds [Ice] : ice [Heat] : heat [Standing] : standing [Walking] : walking [Stairs] : stairs [de-identified] : Patient ELIE CASILLAS is 63 years old and is here to be treated and evaluated for BOTH KNEES. Patient states they have had the knee pain for years but over the last month the pain has gradually start to increase in intensity. Patient was treated by another practice and states the gel injections provided some relief. [] : no [FreeTextEntry1] : Lonny Knee [FreeTextEntry3] : about 1 month [FreeTextEntry6] : weakness [FreeTextEntry7] : down the leg [FreeTextEntry9] : Topical Creams/

## 2024-10-03 NOTE — DISCUSSION/SUMMARY
[de-identified] : General Dx Discussion The patient was advised of the diagnosis. The natural history of the pathology was explained in full to the patient in layman's terms. All questions were answered. The risks and benefits of surgical and non-surgical treatment alternatives were explained in full to the patient.  Case Discussed.  She did well with Visco injections in the past therefore recommend and request authorization to repeat Visco b/l knees.  f/u once approved.  In the interim recommend Mobic as needed.  Patient was given a prescription for an anti-inflammatory medication.  They will take it for the next 5-7 days and then on an as needed basis, as long as there are no medical contra-indications.  Patient is counseled on possible GI and blood pressure side effects.  Entered by Netta HARRISON acting as a scribe. Instructions: Dr. Smith- The documentation recorded by the scribe accurately reflects the service I personally performed and the decisions made by me.

## 2024-10-03 NOTE — PHYSICAL EXAM
[Bilateral] : knee bilaterally [NL (0)] : extension 0 degrees [5___] : hamstring 5[unfilled]/5 [Negative] : negative anterior draw [] : no pain with varus stress [TWNoteComboBox7] : flexion 110 degrees

## 2024-10-16 ENCOUNTER — APPOINTMENT (OUTPATIENT)
Dept: ULTRASOUND IMAGING | Facility: CLINIC | Age: 64
End: 2024-10-16
Payer: COMMERCIAL

## 2024-10-16 ENCOUNTER — APPOINTMENT (OUTPATIENT)
Dept: MAMMOGRAPHY | Facility: CLINIC | Age: 64
End: 2024-10-16
Payer: COMMERCIAL

## 2024-10-16 PROCEDURE — 77067 SCR MAMMO BI INCL CAD: CPT

## 2024-10-16 PROCEDURE — 77063 BREAST TOMOSYNTHESIS BI: CPT

## 2024-10-16 PROCEDURE — 76641 ULTRASOUND BREAST COMPLETE: CPT | Mod: 50

## 2024-10-17 ENCOUNTER — APPOINTMENT (OUTPATIENT)
Dept: ORTHOPEDIC SURGERY | Facility: CLINIC | Age: 64
End: 2024-10-17

## 2024-10-17 VITALS — BODY MASS INDEX: 25.68 KG/M2 | HEIGHT: 61 IN | WEIGHT: 136 LBS

## 2024-10-17 DIAGNOSIS — M17.11 UNILATERAL PRIMARY OSTEOARTHRITIS, RIGHT KNEE: ICD-10-CM

## 2024-10-17 DIAGNOSIS — M17.12 UNILATERAL PRIMARY OSTEOARTHRITIS, LEFT KNEE: ICD-10-CM

## 2024-10-17 PROCEDURE — 20611 DRAIN/INJ JOINT/BURSA W/US: CPT | Mod: 50

## 2024-10-31 ENCOUNTER — APPOINTMENT (OUTPATIENT)
Dept: ORTHOPEDIC SURGERY | Facility: CLINIC | Age: 64
End: 2024-10-31
Payer: COMMERCIAL

## 2024-10-31 VITALS — WEIGHT: 136 LBS | HEIGHT: 61 IN | BODY MASS INDEX: 25.68 KG/M2

## 2024-10-31 DIAGNOSIS — M17.11 UNILATERAL PRIMARY OSTEOARTHRITIS, RIGHT KNEE: ICD-10-CM

## 2024-10-31 DIAGNOSIS — M17.12 UNILATERAL PRIMARY OSTEOARTHRITIS, LEFT KNEE: ICD-10-CM

## 2024-10-31 PROCEDURE — 20610 DRAIN/INJ JOINT/BURSA W/O US: CPT | Mod: 50

## 2024-11-21 ENCOUNTER — APPOINTMENT (OUTPATIENT)
Dept: ORTHOPEDIC SURGERY | Facility: CLINIC | Age: 64
End: 2024-11-21
Payer: COMMERCIAL

## 2024-11-21 VITALS — WEIGHT: 136 LBS | BODY MASS INDEX: 25.68 KG/M2 | HEIGHT: 61 IN

## 2024-11-21 DIAGNOSIS — M17.12 UNILATERAL PRIMARY OSTEOARTHRITIS, LEFT KNEE: ICD-10-CM

## 2024-11-21 DIAGNOSIS — M17.11 UNILATERAL PRIMARY OSTEOARTHRITIS, RIGHT KNEE: ICD-10-CM

## 2024-11-21 PROCEDURE — 20611 DRAIN/INJ JOINT/BURSA W/US: CPT | Mod: 50

## 2024-12-31 NOTE — ED PROVIDER NOTE - CONSTITUTIONAL, MLM
12-May-2024 Well appearing, awake, alert, oriented to person, place, time/situation and in no apparent distress. normal...

## 2025-01-06 ENCOUNTER — APPOINTMENT (OUTPATIENT)
Dept: ORTHOPEDIC SURGERY | Facility: CLINIC | Age: 65
End: 2025-01-06
Payer: COMMERCIAL

## 2025-01-06 VITALS — WEIGHT: 136 LBS | BODY MASS INDEX: 25.68 KG/M2 | HEIGHT: 61 IN

## 2025-01-06 DIAGNOSIS — M17.12 UNILATERAL PRIMARY OSTEOARTHRITIS, LEFT KNEE: ICD-10-CM

## 2025-01-06 DIAGNOSIS — M17.11 UNILATERAL PRIMARY OSTEOARTHRITIS, RIGHT KNEE: ICD-10-CM

## 2025-01-06 DIAGNOSIS — M23.91 UNSPECIFIED INTERNAL DERANGEMENT OF RIGHT KNEE: ICD-10-CM

## 2025-01-06 DIAGNOSIS — M23.92 UNSPECIFIED INTERNAL DERANGEMENT OF LEFT KNEE: ICD-10-CM

## 2025-01-06 PROCEDURE — 99213 OFFICE O/P EST LOW 20 MIN: CPT

## 2025-01-07 ENCOUNTER — APPOINTMENT (OUTPATIENT)
Dept: MRI IMAGING | Facility: CLINIC | Age: 65
End: 2025-01-07
Payer: COMMERCIAL

## 2025-01-07 PROCEDURE — 73721 MRI JNT OF LWR EXTRE W/O DYE: CPT | Mod: RT

## 2025-01-11 ENCOUNTER — APPOINTMENT (OUTPATIENT)
Dept: MRI IMAGING | Facility: CLINIC | Age: 65
End: 2025-01-11
Payer: COMMERCIAL

## 2025-01-11 PROCEDURE — 73721 MRI JNT OF LWR EXTRE W/O DYE: CPT | Mod: LT

## 2025-01-22 ENCOUNTER — NON-APPOINTMENT (OUTPATIENT)
Age: 65
End: 2025-01-22

## 2025-01-23 ENCOUNTER — APPOINTMENT (OUTPATIENT)
Dept: ORTHOPEDIC SURGERY | Facility: CLINIC | Age: 65
End: 2025-01-23
Payer: COMMERCIAL

## 2025-01-23 VITALS — HEIGHT: 61 IN | WEIGHT: 136 LBS | BODY MASS INDEX: 25.68 KG/M2

## 2025-01-23 DIAGNOSIS — M17.11 UNILATERAL PRIMARY OSTEOARTHRITIS, RIGHT KNEE: ICD-10-CM

## 2025-01-23 DIAGNOSIS — M17.12 UNILATERAL PRIMARY OSTEOARTHRITIS, LEFT KNEE: ICD-10-CM

## 2025-01-23 PROCEDURE — 99213 OFFICE O/P EST LOW 20 MIN: CPT

## 2025-03-12 NOTE — ED CDU PROVIDER INITIAL DAY NOTE - PRO INTERPRETER NEED 2
Reviewed pt's chart and discuss pt with RN. At this time pt is receiving a blood transfusion. PT will re-attempt when appropriate.   English

## 2025-03-26 NOTE — H&P PST ADULT - EKG AND INTERPRETATION
"Patient Education     Sinusitis in adults   The Basics   Written by the doctors and editors at [facility]   What is sinusitis? -- Sinusitis is a condition that can cause a stuffy nose, pain in the face, and discharge or \"mucus\" from the nose.  The sinuses are hollow areas in the bones of the face (figure 1). They have a thin lining that normally makes a small amount of mucus. When this lining gets irritated or infected, it swells and makes extra mucus. This causes symptoms.  Sinusitis usually happens after a person gets sick with a cold. The germs causing the cold can infect the sinuses, too. Sometimes, other germs can be the cause of the infection. Often, a person feels like their cold is getting better. But then, they get sinusitis and begin to feel sick again.  What are the symptoms of sinusitis? -- Common symptoms of sinusitis include:   Stuffy or blocked nose   Thick white, yellow, or green discharge from the nose   Pain in the teeth   Pain or pressure in the face - This often feels worse when a person bends forward.  People with sinusitis can also have other symptoms, such as:   Fever   Cough   Trouble smelling   Ear pressure or fullness   Headache   Bad breath   Feeling tired  Most of the time, symptoms start to improve in 7 to 10 days.  Should I see a doctor or nurse? -- See your doctor or nurse if your symptoms last more than 10 days, or if your symptoms first get better but then get worse.  Rarely, sinusitis can lead to serious problems. See your doctor or nurse right away (do not wait 10 days) if you have:   Fever higher than 102°F (38.9°C)   Sudden and severe pain in the face and head   Trouble seeing, or seeing double   Trouble thinking clearly   Swelling or redness around 1 or both eyes   Stiff neck  Is there anything I can do on my own to feel better? -- Yes. To help with your symptoms, you can:   Take an over-the-counter pain reliever to reduce the pain.   Rinse your nose and sinuses with salt water a " "few times a day - Ask your doctor or nurse about the best way to do this.   Drink plenty of fluids - Staying hydrated might help to thin the mucus and make it drain more easily.  Your doctor might also recommend a steroid nose spray to reduce the swelling in your nose, especially if you have allergies. Talk to your doctor if you are thinking of using a steroid spray.  How is sinusitis treated? -- Most of the time, sinusitis does not need to be treated with antibiotic medicines. This is because most sinusitis is caused by viruses, not bacteria, and antibiotics do not kill viruses. In fact, even sinusitis caused by bacteria will usually get better on its own without antibiotics.  Some people with sinusitis do need treatment with antibiotics. If your symptoms have not improved after 10 days, ask your doctor if you should take antibiotics. They might recommend that you wait 1 more week to see if your symptoms improve. But if you have symptoms such as a fever or a lot of pain, they might prescribe antibiotics. If you do get antibiotics, follow all of your doctor's instructions about taking them.  What if my symptoms do not get better? -- If your symptoms do not get better, talk with your doctor or nurse. They might order tests to figure out why you still have symptoms. These can include:   CT scan or other imaging tests - Imaging tests create pictures of the inside of the body.   A test to look inside the sinuses - For this test, a doctor puts a thin tube with a camera on the end into the nose and up into the sinuses.  Some people get a lot of sinus infections or have symptoms that last at least 3 months. These people can have a different type of sinusitis called \"chronic sinusitis.\" Chronic sinusitis can be caused by different things. For example, some people have growths inside their nose or sinuses that are called \"polyps.\" Other people have allergies that cause their symptoms.  Chronic sinusitis can be treated in " different ways. If you have chronic sinusitis, talk with your doctor about which treatments are right for you.  All topics are updated as new evidence becomes available and our peer review process is complete.  This topic retrieved from [employer] on: Feb 28, 2024.  Topic 75668 Version 21.0  Release: 32.2.4 - C32.58  © 2024 UpToDate, Inc. and/or its affiliates. All rights reserved.  figure 1: Sinuses of the face     The sinuses are hollow areas in the bones of the face. This drawing shows where the sinuses are, from the side and front views. There are 4 pairs of sinuses, named for the bones around them: sphenoid, frontal, ethmoid, and maxillary.  Graphic 710906 Version 3.0  Consumer Information Use and Disclaimer   Disclaimer: This generalized information is a limited summary of diagnosis, treatment, and/or medication information. It is not meant to be comprehensive and should be used as a tool to help the user understand and/or assess potential diagnostic and treatment options. It does NOT include all information about conditions, treatments, medications, side effects, or risks that may apply to a specific patient. It is not intended to be medical advice or a substitute for the medical advice, diagnosis, or treatment of a health care provider based on the health care provider's examination and assessment of a patient's specific and unique circumstances. Patients must speak with a health care provider for complete information about their health, medical questions, and treatment options, including any risks or benefits regarding use of medications. This information does not endorse any treatments or medications as safe, effective, or approved for treating a specific patient. UpToDate, Inc. and its affiliates disclaim any warranty or liability relating to this information or the use thereof.The use of this information is governed by the Terms of Use, available at  https://www.[employer]ters[employer]uribus Networksuwer.com/en/know/clinical-effectiveness-terms. 2024© [employer] and its affiliates and/or licensors. All rights reserved.  Copyright   © 2024 [employer] and/or its affiliates. All rights reserved.     ekg in chart